# Patient Record
Sex: FEMALE | Race: WHITE | NOT HISPANIC OR LATINO | Employment: FULL TIME | URBAN - METROPOLITAN AREA
[De-identification: names, ages, dates, MRNs, and addresses within clinical notes are randomized per-mention and may not be internally consistent; named-entity substitution may affect disease eponyms.]

---

## 2020-06-29 ENCOUNTER — LAB VISIT (OUTPATIENT)
Dept: PRIMARY CARE CLINIC | Facility: OTHER | Age: 60
End: 2020-06-29
Attending: INTERNAL MEDICINE
Payer: OTHER GOVERNMENT

## 2020-06-29 DIAGNOSIS — Z03.818 ENCOUNTER FOR OBSERVATION FOR SUSPECTED EXPOSURE TO OTHER BIOLOGICAL AGENTS RULED OUT: Primary | ICD-10-CM

## 2020-06-29 PROCEDURE — U0003 INFECTIOUS AGENT DETECTION BY NUCLEIC ACID (DNA OR RNA); SEVERE ACUTE RESPIRATORY SYNDROME CORONAVIRUS 2 (SARS-COV-2) (CORONAVIRUS DISEASE [COVID-19]), AMPLIFIED PROBE TECHNIQUE, MAKING USE OF HIGH THROUGHPUT TECHNOLOGIES AS DESCRIBED BY CMS-2020-01-R: HCPCS

## 2020-07-02 LAB — SARS-COV-2 RNA RESP QL NAA+PROBE: NEGATIVE

## 2020-07-23 ENCOUNTER — LAB VISIT (OUTPATIENT)
Dept: PRIMARY CARE CLINIC | Facility: CLINIC | Age: 60
End: 2020-07-23
Payer: OTHER GOVERNMENT

## 2020-07-23 DIAGNOSIS — Z00.6 RESEARCH STUDY PATIENT: ICD-10-CM

## 2020-07-23 LAB — SARS-COV-2 IGG SERPLBLD QL IA.RAPID: NEGATIVE

## 2020-07-23 PROCEDURE — 86769 SARS-COV-2 COVID-19 ANTIBODY: CPT

## 2020-07-23 PROCEDURE — U0003 INFECTIOUS AGENT DETECTION BY NUCLEIC ACID (DNA OR RNA); SEVERE ACUTE RESPIRATORY SYNDROME CORONAVIRUS 2 (SARS-COV-2) (CORONAVIRUS DISEASE [COVID-19]), AMPLIFIED PROBE TECHNIQUE, MAKING USE OF HIGH THROUGHPUT TECHNOLOGIES AS DESCRIBED BY CMS-2020-01-R: HCPCS

## 2020-07-23 NOTE — RESEARCH
Date of Consent: 7/23/2020    Sponsor: [facility]    Study Title/IRB Number: Observational study of Sars-CoV2 Immunoglobulin G (IgG) seroprevalence among the [facility] population over time 2020.163  Principle Investigator: Ashanti Cortez, PhD    Did the patient need translation services? No   name: N/A    Prior to the Informed Consent (IC) being signed, or any study protocol required data collection, testing, procedure, or intervention being performed, the following was done and/or discussed:   Patient was given a paper copy of the IC for review    Patient was given study FAQ   Purpose of the study and qualifications to participate    Study design and tests or procedures done at this visit   Confidentiality and HIPAA Authorization for Release of Medical Records for the research trial/ subject's rights/research related injury   Risk, Benefits, Alternative Treatments, Compensation and Costs   Participation in the research trial is voluntary and patient may withdraw at anytime   Contact information for study related questions    Patient verbalizes understanding of the above: Yes  Contact information for PI and IRB given to patient: Yes  Patient able to adequately summarize: the purpose of the study, the risks associated with the study, and all procedures, testing, and follow-ups associated with the study: Yes    The consent was discussed verbally with the patient and all questions were answered satisfactorily. Patient gave verbal consent for the Seroprevalence research study with an IRB approval date of 06/23/2020.      The Consent, Consent Witness and name of Clinical Research Coordinator consenting was captured and documented in REDCap.    All Inclusion and Exclusion Criteria reviewed, subject meets all Inclusion criteria and does not meet any Exclusion Criteria at this time.     Patient Eligibility was confirmed.    Patient responded to survey questions.    The following biospecimen  collection procedures were collected:    -Nasopharyngeal Swab Collection  -Blood collection

## 2020-07-24 LAB — SARS-COV-2 RNA RESP QL NAA+PROBE: NOT DETECTED

## 2021-07-01 ENCOUNTER — PATIENT MESSAGE (OUTPATIENT)
Dept: ADMINISTRATIVE | Facility: OTHER | Age: 61
End: 2021-07-01

## 2023-11-02 ENCOUNTER — OFFICE VISIT (OUTPATIENT)
Dept: GASTROENTEROLOGY | Facility: CLINIC | Age: 63
End: 2023-11-02
Payer: COMMERCIAL

## 2023-11-02 VITALS
HEIGHT: 68 IN | DIASTOLIC BLOOD PRESSURE: 82 MMHG | BODY MASS INDEX: 20.98 KG/M2 | SYSTOLIC BLOOD PRESSURE: 112 MMHG | HEART RATE: 77 BPM | WEIGHT: 138.44 LBS

## 2023-11-02 DIAGNOSIS — K86.89 PANCREATIC INSUFFICIENCY: Primary | ICD-10-CM

## 2023-11-02 DIAGNOSIS — R19.5 LOW FECAL ELASTASE LEVEL: ICD-10-CM

## 2023-11-02 PROCEDURE — 1160F PR REVIEW ALL MEDS BY PRESCRIBER/CLIN PHARMACIST DOCUMENTED: ICD-10-PCS | Mod: CPTII,S$GLB,, | Performed by: INTERNAL MEDICINE

## 2023-11-02 PROCEDURE — 3079F PR MOST RECENT DIASTOLIC BLOOD PRESSURE 80-89 MM HG: ICD-10-PCS | Mod: CPTII,S$GLB,, | Performed by: INTERNAL MEDICINE

## 2023-11-02 PROCEDURE — 1159F PR MEDICATION LIST DOCUMENTED IN MEDICAL RECORD: ICD-10-PCS | Mod: CPTII,S$GLB,, | Performed by: INTERNAL MEDICINE

## 2023-11-02 PROCEDURE — 99204 OFFICE O/P NEW MOD 45 MIN: CPT | Mod: S$GLB,,, | Performed by: INTERNAL MEDICINE

## 2023-11-02 PROCEDURE — 3008F BODY MASS INDEX DOCD: CPT | Mod: CPTII,S$GLB,, | Performed by: INTERNAL MEDICINE

## 2023-11-02 PROCEDURE — 3074F PR MOST RECENT SYSTOLIC BLOOD PRESSURE < 130 MM HG: ICD-10-PCS | Mod: CPTII,S$GLB,, | Performed by: INTERNAL MEDICINE

## 2023-11-02 PROCEDURE — 99999 PR PBB SHADOW E&M-EST. PATIENT-LVL III: CPT | Mod: PBBFAC,,, | Performed by: INTERNAL MEDICINE

## 2023-11-02 PROCEDURE — 3079F DIAST BP 80-89 MM HG: CPT | Mod: CPTII,S$GLB,, | Performed by: INTERNAL MEDICINE

## 2023-11-02 PROCEDURE — 99999 PR PBB SHADOW E&M-EST. PATIENT-LVL III: ICD-10-PCS | Mod: PBBFAC,,, | Performed by: INTERNAL MEDICINE

## 2023-11-02 PROCEDURE — 1160F RVW MEDS BY RX/DR IN RCRD: CPT | Mod: CPTII,S$GLB,, | Performed by: INTERNAL MEDICINE

## 2023-11-02 PROCEDURE — 3074F SYST BP LT 130 MM HG: CPT | Mod: CPTII,S$GLB,, | Performed by: INTERNAL MEDICINE

## 2023-11-02 PROCEDURE — 99204 PR OFFICE/OUTPT VISIT, NEW, LEVL IV, 45-59 MIN: ICD-10-PCS | Mod: S$GLB,,, | Performed by: INTERNAL MEDICINE

## 2023-11-02 PROCEDURE — 3008F PR BODY MASS INDEX (BMI) DOCUMENTED: ICD-10-PCS | Mod: CPTII,S$GLB,, | Performed by: INTERNAL MEDICINE

## 2023-11-02 PROCEDURE — 1159F MED LIST DOCD IN RCRD: CPT | Mod: CPTII,S$GLB,, | Performed by: INTERNAL MEDICINE

## 2023-11-02 RX ORDER — PANCRELIPASE LIPASE, PANCRELIPASE PROTEASE, PANCRELIPASE AMYLASE 40000; 126000; 168000 [USP'U]/1; [USP'U]/1; [USP'U]/1
CAPSULE, DELAYED RELEASE ORAL
COMMUNITY
End: 2024-03-06

## 2023-11-02 RX ORDER — ATOMOXETINE 100 MG/1
100 CAPSULE ORAL
COMMUNITY
Start: 2020-05-23 | End: 2024-03-06

## 2023-11-02 RX ORDER — MOXIFLOXACIN 5 MG/ML
SOLUTION/ DROPS OPHTHALMIC
COMMUNITY
Start: 2023-10-23

## 2023-11-02 NOTE — PROGRESS NOTES
Advanced Endoscopy / Pancreaticobiliary Service    Reason for visit (Chief Complaint):  Pancreatic insufficiency    Referring provider/PCP: No address on file    History of Present Illness: Patient presents for further evaluation of pancreatic insufficiency.  She self scheduled this appointment.  Her cousin is a cardiologist in [facility], and suggested that she should see a pancreatic specialist, which is what prompted this appointment.  She is had a fecal elastase checked twice in the last 1 year, and on both occasions this was low.  She does not recall, whether the sample that she is submitted was watery in nature.  She does have history of microscopic colitis and has had exacerbations in the past that were treated with budesonide.  She otherwise reports more commonly suffering from constipation, and she does use MiraLax regularly for this.     She denies history of pancreatitis.  She is not a smoker.  She drinks maybe 1 or 2 glasses of ETOH per day.  She was started on pancreatic enzyme supplementation following the stool sample showing low elastase.      Physical Exam:  General: Well-developed, well-appearing, no acute distress      Laboratory:   Reviewed recent labs that are available in epic.  Her fecal elastase in August was less than 50.    Imaging:  Reviewed CT imaging report.  She had a CT scan performed in May with and without IV contrast.  The pancreas was reported to be normal on that scan.    Assessment/Plan:  Pancreatic insufficiency- I would like to repeat a fecal elastase.  We discussed the importance of submitting a stool sample that is solid appearing without any watery element.  If this again proved to be low, I think next step would include performing an endoscopic ultrasound to obtained a more detailed view of the pancreas.        Eduardo Mondragon MD, MARINA   - Department of Gastroenterology  [facility] [place]

## 2023-11-03 ENCOUNTER — LAB VISIT (OUTPATIENT)
Dept: LAB | Facility: HOSPITAL | Age: 63
End: 2023-11-03
Attending: INTERNAL MEDICINE
Payer: COMMERCIAL

## 2023-11-03 DIAGNOSIS — K86.89 PANCREATIC INSUFFICIENCY: ICD-10-CM

## 2023-11-03 PROCEDURE — 82653 EL-1 FECAL QUANTITATIVE: CPT | Performed by: INTERNAL MEDICINE

## 2023-11-06 ENCOUNTER — PATIENT MESSAGE (OUTPATIENT)
Dept: GASTROENTEROLOGY | Facility: CLINIC | Age: 63
End: 2023-11-06
Payer: COMMERCIAL

## 2023-11-07 LAB — ELASTASE 1, FECAL: 112 MCG/G

## 2023-11-08 ENCOUNTER — TELEPHONE (OUTPATIENT)
Dept: ENDOSCOPY | Facility: HOSPITAL | Age: 63
End: 2023-11-08
Payer: COMMERCIAL

## 2023-11-08 ENCOUNTER — PATIENT MESSAGE (OUTPATIENT)
Dept: ENDOSCOPY | Facility: HOSPITAL | Age: 63
End: 2023-11-08
Payer: COMMERCIAL

## 2023-11-08 DIAGNOSIS — K86.89 PANCREATIC INSUFFICIENCY: Primary | ICD-10-CM

## 2023-11-08 NOTE — TELEPHONE ENCOUNTER
Spoke to patient to schedule procedure(s) Upper Endoscopy Ultrasound (EUS)       Physician to perform procedure(s) Dr. ERIK Mondragon  Date of Procedure (s) 12/8/23  Arrival Time 9:15 AM  Time of Procedure(s) 10:15 AM   Location of Procedure(s) [facility] [address] Floor  Type of Rx Prep sent to patient: Other  Instructions provided to patient via [facility]    Patient was informed on the following information and verbalized understanding. Screening questionnaire reviewed with patient and complete. If procedure requires anesthesia, a responsible adult needs to be present to accompany the patient home, patient cannot drive after receiving anesthesia. Appointment details are tentative, especially check-in time. Patient will receive a prep-op call 4 days prior to confirm check-in time for procedure. If applicable the patient should contact their pharmacy to verify Rx for procedure prep is ready for pick-up. Patient was advised to call the scheduling department at [phone] if pharmacy states no Rx is available. Patient was advised to call the endoscopy scheduling department if any questions or concerns arise.      [facility] Scheduling Department

## 2023-11-09 ENCOUNTER — TELEPHONE (OUTPATIENT)
Dept: GASTROENTEROLOGY | Facility: CLINIC | Age: 63
End: 2023-11-09
Payer: COMMERCIAL

## 2023-11-09 NOTE — TELEPHONE ENCOUNTER
----- Message from Juhi Ag sent at 11/9/2023  8:36 AM CST -----  Regarding: Consult/Advisory  Contact: [phone]  CONSULT/ADVISORY    Name of Caller:  FRANCISCA THURMAN [[id number]]    Contact Preference:  [phone]    Nature of Call:  Pt is requesting a call back.  States she tried to answer Dr Mondragon on the portal but it didn't allow her to reply.  Please call.

## 2023-12-06 ENCOUNTER — TELEPHONE (OUTPATIENT)
Dept: ENDOSCOPY | Facility: HOSPITAL | Age: 63
End: 2023-12-06
Payer: COMMERCIAL

## 2023-12-06 NOTE — TELEPHONE ENCOUNTER
Spoke with patient about arrival time @ 0915.   [facility]    Day Before Procedure 12/07/23      You may have a light evening meal.   No solid food after 7:00 pm.   Continue drinking clear liquids.        Day of the Procedure 12/08/23      You may have water/clear liquids until 4 hours before your procedure or as directed by the scheduling nurse 6:15 AM.   See below for list.     What You CANNOT do:   Do not drink milk or anything colored red.  Do not drink alcohol.  No gum chewing or candy morning of procedure     Liquids That Are OK to Drink:   Water  Sports drinks (Gatorade, Power-Aid)  Coffee or tea (no cream or nondairy creamer)  Clear juices without pulp (apple, white grape)  Gelatin desserts (no fruit or toppings)  Clear soda (sprite, coke, ginger ale)  Chicken broth (until 12 midnight the night before procedure)      Medications: Do not take Insulin or oral diabetic medications the day of the procedure.    Take as prescribed: heart, seizure and blood pressure medication in the morning with a sip of water (less than an ounce).  Take any breathing medications and bring inhalers to hospital with you.     Leave all valuables and jewelry at home. Wear comfortable clothes to procedure to change into hospital gown.   You cannot drive for 24 hours after your procedure because you will receive sedation for your procedure to make you comfortable.    A ride must be provided at discharge.

## 2023-12-08 ENCOUNTER — HOSPITAL ENCOUNTER (OUTPATIENT)
Facility: HOSPITAL | Age: 63
Discharge: HOME | End: 2023-12-08
Attending: INTERNAL MEDICINE | Admitting: INTERNAL MEDICINE
Payer: COMMERCIAL

## 2023-12-08 ENCOUNTER — ANESTHESIA EVENT (OUTPATIENT)
Dept: ENDOSCOPY | Facility: HOSPITAL | Age: 63
End: 2023-12-08
Payer: COMMERCIAL

## 2023-12-08 ENCOUNTER — ANESTHESIA (OUTPATIENT)
Dept: ENDOSCOPY | Facility: HOSPITAL | Age: 63
End: 2023-12-08
Payer: COMMERCIAL

## 2023-12-08 VITALS
WEIGHT: 134 LBS | BODY MASS INDEX: 20.31 KG/M2 | TEMPERATURE: 99 F | OXYGEN SATURATION: 100 % | SYSTOLIC BLOOD PRESSURE: 108 MMHG | DIASTOLIC BLOOD PRESSURE: 68 MMHG | RESPIRATION RATE: 14 BRPM | HEIGHT: 68 IN | HEART RATE: 75 BPM

## 2023-12-08 DIAGNOSIS — K86.89 PANCREATIC INSUFFICIENCY: ICD-10-CM

## 2023-12-08 PROCEDURE — 63600175 PHARM REV CODE 636 W HCPCS: Performed by: NURSE ANESTHETIST, CERTIFIED REGISTERED

## 2023-12-08 PROCEDURE — 25000003 PHARM REV CODE 250: Performed by: NURSE ANESTHETIST, CERTIFIED REGISTERED

## 2023-12-08 PROCEDURE — 25000003 PHARM REV CODE 250: Performed by: INTERNAL MEDICINE

## 2023-12-08 PROCEDURE — 43259 EGD US EXAM DUODENUM/JEJUNUM: CPT | Performed by: INTERNAL MEDICINE

## 2023-12-08 PROCEDURE — 43259 PR ENDOSCOPIC ULTRASOUND EXAM: ICD-10-PCS | Mod: ,,, | Performed by: INTERNAL MEDICINE

## 2023-12-08 PROCEDURE — 43259 EGD US EXAM DUODENUM/JEJUNUM: CPT | Mod: ,,, | Performed by: INTERNAL MEDICINE

## 2023-12-08 PROCEDURE — 37000008 HC ANESTHESIA 1ST 15 MINUTES: Performed by: INTERNAL MEDICINE

## 2023-12-08 PROCEDURE — D9220A PRA ANESTHESIA: ICD-10-PCS | Mod: ANES,,, | Performed by: STUDENT IN AN ORGANIZED HEALTH CARE EDUCATION/TRAINING PROGRAM

## 2023-12-08 PROCEDURE — D9220A PRA ANESTHESIA: Mod: CRNA,,, | Performed by: NURSE ANESTHETIST, CERTIFIED REGISTERED

## 2023-12-08 PROCEDURE — 37000009 HC ANESTHESIA EA ADD 15 MINS: Performed by: INTERNAL MEDICINE

## 2023-12-08 PROCEDURE — D9220A PRA ANESTHESIA: ICD-10-PCS | Mod: CRNA,,, | Performed by: NURSE ANESTHETIST, CERTIFIED REGISTERED

## 2023-12-08 PROCEDURE — D9220A PRA ANESTHESIA: Mod: ANES,,, | Performed by: STUDENT IN AN ORGANIZED HEALTH CARE EDUCATION/TRAINING PROGRAM

## 2023-12-08 RX ORDER — SODIUM CHLORIDE 0.9 % (FLUSH) 0.9 %
10 SYRINGE (ML) INJECTION
Status: DISCONTINUED | OUTPATIENT
Start: 2023-12-08 | End: 2023-12-08 | Stop reason: HOSPADM

## 2023-12-08 RX ORDER — PROPOFOL 10 MG/ML
VIAL (ML) INTRAVENOUS
Status: DISCONTINUED | OUTPATIENT
Start: 2023-12-08 | End: 2023-12-08

## 2023-12-08 RX ORDER — SODIUM CHLORIDE 9 MG/ML
INJECTION, SOLUTION INTRAVENOUS CONTINUOUS
Status: DISCONTINUED | OUTPATIENT
Start: 2023-12-08 | End: 2023-12-08 | Stop reason: HOSPADM

## 2023-12-08 RX ADMIN — SODIUM CHLORIDE: 9 INJECTION, SOLUTION INTRAVENOUS at 10:12

## 2023-12-08 RX ADMIN — SODIUM CHLORIDE: 0.9 INJECTION, SOLUTION INTRAVENOUS at 10:12

## 2023-12-08 RX ADMIN — PROPOFOL 80 MG: 10 INJECTION, EMULSION INTRAVENOUS at 10:12

## 2023-12-08 NOTE — PROVATION PATIENT INSTRUCTIONS
Discharge Summary/Instructions after an Endoscopic Procedure  Patient Name: Sushma Elder  Patient MRN: [id number]  Patient YOB: 1960 Friday, December 8, 2023  Eduardo Mondragon MD  Dear patient,  As a result of recent federal legislation (The Federal Cures Act), you may   receive lab or pathology results from your procedure in your [facility]   account before your physician is able to contact you. Your physician or   their representative will relay the results to you with their   recommendations at their soonest availability.  Thank you,  Your health is very important to us during the Covid Crisis. Following your   procedure today, you will receive a daily text for 2 weeks asking about   signs or symptoms of Covid 19.  Please respond to this text when you   receive it so we can follow up and keep you as safe as possible.   RESTRICTIONS:  During your procedure today, you received medications for sedation.  These   medications may affect your judgment, balance and coordination.  Therefore,   for 24 hours, you have the following restrictions:   - DO NOT drive a car, operate machinery, make legal/financial decisions,   sign important papers or drink alcohol.    ACTIVITY:  Today: no heavy lifting, straining or running due to procedural   sedation/anesthesia.  The following day: return to full activity including work.  DIET:  Eat and drink normally unless instructed otherwise.     TREATMENT FOR COMMON SIDE EFFECTS:  - Mild abdominal pain, nausea, belching, bloating or excessive gas:  rest,   eat lightly and use a heating pad.  - Sore Throat: treat with throat lozenges and/or gargle with warm salt   water.  - Because air was used during the procedure, expelling large amounts of air   from your rectum or belching is normal.  - If a bowel prep was taken, you may not have a bowel movement for 1-3 days.    This is normal.  SYMPTOMS TO WATCH FOR AND REPORT TO YOUR PHYSICIAN:  1. Abdominal pain or bloating, other than  gas cramps.  2. Chest pain.  3. Back pain.  4. Signs of infection such as: chills or fever occurring within 24 hours   after the procedure.  5. Rectal bleeding, which would show as bright red, maroon, or black stools.   (A tablespoon of blood from the rectum is not serious, especially if   hemorrhoids are present.)  6. Vomiting.  7. Weakness or dizziness.  GO DIRECTLY TO THE NEAREST EMERGENCY ROOM IF YOU HAVE ANY OF THE FOLLOWING:      Difficulty breathing              Chills and/or fever over 101 F   Persistent vomiting and/or vomiting blood   Severe abdominal pain   Severe chest pain   Black, tarry stools   Bleeding- more than one tablespoon   Any other symptom or condition that you feel may need urgent attention  Your doctor recommends these additional instructions:  If any biopsies were taken, your doctors clinic will contact you in 1 to 2   weeks with any results.  - Discharge patient to home (ambulatory).   - Patient has a contact number available for emergencies.  The signs and   symptoms of potential delayed complications were discussed with the   patient.  Return to normal activities tomorrow.  Written discharge   instructions were provided to the patient.   - Continue present medications.   - Return to primary care physician as previously scheduled.   - There are no EUS features to support chronic pancreatitis. Although recent   fecal elastase was borderline low, I wonder if this may be falsely low.   Given that patient reports that she is not taking pancreatic enzymes and   feels fine, it may be reasonable to stop this for now and assess clinical   course. Will discuss with patient.  For questions, problems or results please call your physician - Eduardo Mondragon MD.  EMERGENCY PHONE NUMBER: [phone],  LAB RESULTS: [phone]  IF A COMPLICATION OR EMERGENCY SITUATION ARISES AND YOU ARE UNABLE TO REACH   YOUR PHYSICIAN - GO DIRECTLY TO THE EMERGENCY ROOM.  Eduardo Mondragon MD  12/8/2023 11:24:48  AM  This report has been verified and signed electronically.  Dear patient,  As a result of recent federal legislation (The Federal Cures Act), you may   receive lab or pathology results from your procedure in your [facility]   account before your physician is able to contact you. Your physician or   their representative will relay the results to you with their   recommendations at their soonest availability.  Thank you,  PROVATION

## 2023-12-08 NOTE — H&P
Short Stay Endoscopy History and Physical    PCP - Kem Horn MD  Referring Physician - Kem Horn MD  [id number]  [facility]  [place] [id number]    Procedure - EUS  ASA - per anesthesia  Mallampati - per anesthesia  History of Anesthesia problems - no  Family history Anesthesia problems -  no   Plan of anesthesia - General    HPI:  This is a 63 y.o. female here for evaluation of: pancreatic insufficiency    Reflux - no  Dysphagia - no  Abdominal pain - no  Diarrhea - no    ROS:  Constitutional: No fevers, chills, No weight loss  CV: No chest pain  Pulm: No cough, No shortness of breath  GI: see HPI    Medical History:  has a past medical history of Lichen sclerosus, Microscopic colitis, and Osteopenia (08/29/2023).    Surgical History:  has a past surgical history that includes Dilation and curettage of uterus; Diagnostic laparoscopy; Hysteroscopy; myosure; Breast biopsy; Cervical fusion (07/15/2021); and Hand surgery (Right, 03/2023).    Family History: family history includes Hypertension in her father and mother..    Social History:  reports that she has never smoked. She has never used smokeless tobacco.    Review of patient's allergies indicates:  No Known Allergies    Medications:   Medications Prior to Admission   Medication Sig Dispense Refill Last Dose    atomoxetine (STRATTERA) 100 mg capsule Take 100 mg by mouth.   12/8/2023    clobetasoL (TEMOVATE) 0.05 % cream Apply topically 2 (two) times daily. 45 g 0 Past Week    desvenlafaxine succinate (PRISTIQ) 100 MG Tb24    12/8/2023    moxifloxacin (VIGAMOX) 0.5 % ophthalmic solution Place into both eyes.   12/8/2023    traZODone (DESYREL) 100 MG tablet    12/7/2023    ZENPEP 40,000-126,000- 168,000 unit CpDR Take by mouth.   12/7/2023       Physical Exam:    Vital Signs:   Vitals:    12/08/23 1027   BP: 117/71   Pulse: 80   Resp: 18   Temp: 98.9 °F (37.2 °C)       General Appearance: Well appearing in no acute  distress  Lungs: no labored breathing  CVS:  regular rate  Abdomen: non tender    Labs:  Lab Results   Component Value Date     12/01/2021    K 4.2 12/01/2021    CREATININE 0.87 12/01/2021    BUN 18 12/01/2021    TSH 3.12 09/13/2023       I have explained the risks and benefits of this endoscopic procedure to the patient including but not limited to bleeding, inflammation, infection, perforation, and death.      Eduardo Mondragon MD

## 2023-12-08 NOTE — TRANSFER OF CARE
"Anesthesia Transfer of Care Note    Patient: Sushma Elder    Procedure(s) Performed: Procedure(s) (LRB):  ULTRASOUND, UPPER GI TRACT, ENDOSCOPIC (N/A)    Patient location: PACU    Transport from OR: Transported from OR on room air with adequate spontaneous ventilation    Post pain: adequate analgesia    Post assessment: no apparent anesthetic complications    Post vital signs: stable    Level of consciousness: sedated    Nausea/Vomiting: no nausea/vomiting    Complications: none    Transfer of care protocol was followed    Last vitals: Visit Vitals  /71   Pulse 80   Temp 37.2 °C (98.9 °F)   Resp 18   Ht 5' 8" (1.727 m)   Wt 60.8 kg (134 lb)   SpO2 100%   Breastfeeding No   BMI 20.37 kg/m²     "

## 2023-12-08 NOTE — ANESTHESIA PREPROCEDURE EVALUATION
[facility]  Anesthesia Pre-Operative Evaluation         Patient Name: Sushma Elder  YOB: 1960  MRN: [id number]    SUBJECTIVE:     12/08/2023    Procedure(s) (LRB):  ULTRASOUND, UPPER GI TRACT, ENDOSCOPIC (N/A)    Sushma Elder is a 63 y.o. female here for Procedure(s) (LRB):  ULTRASOUND, UPPER GI TRACT, ENDOSCOPIC (N/A)    Drips:     There is no problem list on file for this patient.      Review of patient's allergies indicates:  No Known Allergies    No current facility-administered medications on file prior to encounter.     Current Outpatient Medications on File Prior to Encounter   Medication Sig Dispense Refill    atomoxetine (STRATTERA) 100 mg capsule Take 100 mg by mouth.      clobetasoL (TEMOVATE) 0.05 % cream Apply topically 2 (two) times daily. 45 g 0    desvenlafaxine succinate (PRISTIQ) 100 MG Tb24       moxifloxacin (VIGAMOX) 0.5 % ophthalmic solution Place into both eyes.      traZODone (DESYREL) 100 MG tablet       ZENPEP 40,000-126,000- 168,000 unit CpDR Take by mouth.         Past Surgical History:   Procedure Laterality Date    BREAST BIOPSY      CERVICAL FUSION  07/15/2021    DIAGNOSTIC LAPAROSCOPY      DILATION AND CURETTAGE OF UTERUS      HAND SURGERY Right 03/2023    HYSTEROSCOPY      myosure         Social History     Socioeconomic History    Marital status: Single   Tobacco Use    Smoking status: Never    Smokeless tobacco: Never         OBJECTIVE:     Vital Signs Range (Last 24H):  Temp:  [37.2 °C (98.9 °F)] 37.2 °C (98.9 °F)  Pulse:  [80] 80  Resp:  [18] 18  SpO2:  [100 %] 100 %  BP: (117)/(71) 117/71    Significant Labs:  Lab Results   Component Value Date     12/01/2021    K 4.2 12/01/2021    CREATININE 0.87 12/01/2021    BUN 18 12/01/2021    TSH 3.12 09/13/2023             Pre-op Assessment    I have reviewed the Patient Summary Reports.     I have reviewed the Nursing Notes. I have reviewed the NPO Status.   I have reviewed the Medications.      Review of Systems  Anesthesia Hx:  No problems with previous Anesthesia   History of prior surgery of interest to airway management or planning:            Denies Personal Hx of Anesthesia complications.                    Social:  Non-Smoker, No Alcohol Use       Cardiovascular:      Denies Hypertension.   Denies MI.                                         Pulmonary:  Pulmonary Normal   Denies COPD.  Denies Asthma.     Denies Sleep Apnea.                Hepatic/GI:      Denies GERD. Denies Liver Disease.  Gi issues   Exocrine pancreatitic insufficiency           Neurological:  Neurology Normal Denies TIA.  Denies CVA.    Denies Seizures.                                Endocrine:  Endocrine Normal Denies Diabetes. Denies Hypothyroidism.  Denies Hyperthyroidism.             Physical Exam  General: Well nourished, Cooperative, Alert and Oriented    Airway:  Mallampati: II / I  Mouth Opening: Normal  TM Distance: Normal  Tongue: Normal    Dental:  Intact        Anesthesia Plan  Type of Anesthesia, risks & benefits discussed:    Anesthesia Type: Gen Natural Airway  Intra-op Monitoring Plan: Standard ASA Monitors  Post Op Pain Control Plan: multimodal analgesia  Induction:  IV  Informed Consent: Informed consent signed with the Patient and all parties understand the risks and agree with anesthesia plan.  All questions answered.   ASA Score: 2  Day of Surgery Review of History & Physical: H&P Update referred to the surgeon/provider.    Ready For Surgery From Anesthesia Perspective.     .

## 2023-12-08 NOTE — ANESTHESIA POSTPROCEDURE EVALUATION
Anesthesia Post Evaluation    Patient: Sushma Elder    Procedure(s) Performed: Procedure(s) (LRB):  ULTRASOUND, UPPER GI TRACT, ENDOSCOPIC (N/A)    Final Anesthesia Type: general      Patient location during evaluation: PACU  Patient participation: Yes- Able to Participate  Level of consciousness: awake  Post-procedure vital signs: reviewed and stable  Pain management: adequate  Airway patency: patent    PONV status at discharge: No PONV  Anesthetic complications: no      Cardiovascular status: blood pressure returned to baseline  Respiratory status: unassisted  Hydration status: euvolemic  Follow-up not needed.              Vitals Value Taken Time   /68 12/08/23 1135   Pulse 75 12/08/23 1135   Resp 14 12/08/23 1135   SpO2 100 % 12/08/23 1135         Event Time   Out of Recovery 11:36:06         Pain/Sarita Score: Sarita Score: 10 (12/8/2023 11:20 AM)

## 2023-12-08 NOTE — PLAN OF CARE
Pre-procedure completed with pt, friend at the bedside. Bed in lowest position with call light within reach.

## 2023-12-20 ENCOUNTER — TELEPHONE (OUTPATIENT)
Dept: ENDOSCOPY | Facility: HOSPITAL | Age: 63
End: 2023-12-20
Payer: COMMERCIAL

## 2023-12-20 DIAGNOSIS — R93.89 ABNORMAL FINDING ON IMAGING: Primary | ICD-10-CM

## 2023-12-22 ENCOUNTER — TELEPHONE (OUTPATIENT)
Dept: ENDOSCOPY | Facility: HOSPITAL | Age: 63
End: 2023-12-22
Payer: COMMERCIAL

## 2023-12-22 NOTE — TELEPHONE ENCOUNTER
Spoke to pt. [facility] w/ Dr. Mondragon scheduled. Pt stated will have PCP placed order for CMP. No other concerns at this time.

## 2025-03-04 ENCOUNTER — TELEPHONE (OUTPATIENT)
Dept: PAIN MEDICINE | Facility: CLINIC | Age: 65
End: 2025-03-04
Payer: MEDICARE

## 2025-03-04 NOTE — TELEPHONE ENCOUNTER
Reached out to patient to schedule appointment from messages. Apt has been made.   Pt understand. All questions answered.     Luis Mathias Medical Assistant

## 2025-03-05 NOTE — PROGRESS NOTES
"New Patient Interventional Pain Note (Initial Visit)    Referring Physician: Loida Vang    PCP: Kem Horn MD    Chief Complaint:   Chief Complaint   Patient presents with    Back Pain        SUBJECTIVE:    Sushma Elder is a 64 y.o. female with past medical history significant for ADD, osteopenia who presents to the clinic for the evaluation of lower back and groin pain as well as shoulder pain.  Patient reports her most severe pain is in the lower back and groin territory.  Pain has been present for several decades without inciting accident injury or trauma.  Pain has been particularly severe since February 2, 2025 ("out of the blue").  Pain is intermittent and today is rated a 6/10.  Pain at its best is a 3/10 at its worse is a 9/10.  Pain is described as aching, burning and a tightness sensation in the lower back.  Patient reports she is very active and participates in spin, yoga, [employer] personal training and stretching as well as barre classes.  She believes maybe the barre classes incited her current symptoms.  She denies more distal radiculopathy into the lower extremities or feet, weakness in the lower extremities or bowel or bladder incontinence.  In the lower back and groin can be exacerbated when getting out of bed, bending, walking and lifting.  Pain is improved with stretching and yoga classes as well as combination of Mobic 7.5 mg and Robaxin 750 mg.  Patient has trialed gabapentin in the past which made her feel crazy" and has also taken Cymbalta which may have been for antidepressant purposes.  She has received prior lumbar epidural steroid injection over 10 years prior at an outside institution.  She has continued physician directed physical therapy exercises for lower back and groin pain over the last 8 weeks from January 6, 2025 through March 6, 2025 with noticeable improvement in pain range of motion and functionality.    She also reports bilateral shoulder pain in the " "area of her traps."She has a history of prior cervical fusion ("C2-4")with Dr. Milton Connor ([facility]) in 2022.  She reports having elbow and wrist pain and neck pain for over 30 years prior to her surgery which after 1 year of surgery her pain in her elbow and wrist has completely resolved.  Today her primary concern is tightness along bilateral trapezius and rhomboid distributions.  Patient has undergone TENS stimulation with physical therapy.    Of note patient has been enrolled in conventional land-based physical therapy at [facility] from 05/06/2024 through 03/06/2025 and is continuing twice weekly sessions.  She has received prior cervical epidural steroid injection at an outside institution more than 10 years prior.    Patient does mention that she has been diagnosed with early Alzheimer's and receives Leqembi IV anti-amyloid infusions for this from her neurologist (Dr. Jorge Denney).    Patient denies night fever/night sweats, urinary incontinence, bowel incontinence, significant weight loss, significant motor weakness, and loss of sensations.      Pain Disability Index Review:         3/6/2025     1:05 PM   Last 3 PDI Scores   Pain Disability Index (PDI) 26       Non-Pharmacologic Treatments:  Physical Therapy/Home Exercise: yes  Ice/Heat:yes  TENS: yes  Acupuncture: yes  Massage: yes  Chiropractic: no    Other: yes; ACDF; Dr. Milton Connor      Pain Medications:  - Adjuvant Medications: Trazodone (Desyrel)    Pain Procedures:   -outside institution: [facility]    Past Medical History:   Diagnosis Date    ADD (attention deficit disorder)     Colitis     Lichen sclerosus     Microscopic colitis     Osteopenia 08/29/2023    Osteopenia of spine Normal Hip     Past Surgical History:   Procedure Laterality Date    BREAST BIOPSY      CERVICAL FUSION  07/15/2021    COLONOSCOPY  09/2021    DIAGNOSTIC LAPAROSCOPY      DILATION AND CURETTAGE OF UTERUS      ENDOSCOPIC ULTRASOUND OF UPPER GASTROINTESTINAL TRACT N/A " 2023    Procedure: ULTRASOUND, UPPER GI TRACT, ENDOSCOPIC;  Surgeon: Eduardo Mondragon MD;  Location: [facility];  Service: Endoscopy;  Laterality: N/A;   portal instr    HAND SURGERY Right 2023    HYSTEROSCOPY      myosure      REPAIR OF MENISCUS OF KNEE Right 2024     Review of patient's allergies indicates:  No Known Allergies    Current Outpatient Medications   Medication Sig    clobetasoL (TEMOVATE) 0.05 % cream Apply topically to affected area twice daily    desvenlafaxine succinate (PRISTIQ) 100 MG Tb24     desvenlafaxine succinate (PRISTIQ) 100 MG Tb24 Take 1 tablet by mouth once daily.    MAGNESIUM CITRATE ORAL Take by mouth.    memantine (NAMENDA) 10 MG Tab SMARTSI Tablet(s) By Mouth Morning-Night    moxifloxacin (VIGAMOX) 0.5 % ophthalmic solution Place into both eyes.    perfluorohexyloctane, PF, 100 % Drop Apply 1 drop to eye.    traZODone (DESYREL) 100 MG tablet     traZODone (DESYREL) 100 MG tablet Take 1 tablet by mouth every evening.    meloxicam (MOBIC) 7.5 MG tablet Take 1 tablet (7.5 mg total) by mouth once daily.    methocarbamoL (ROBAXIN) 750 MG Tab Take 1 tablet (750 mg total) by mouth 3 (three) times daily as needed.     No current facility-administered medications for this visit.         ROS:  GENERAL:  No weight loss, malaise or fevers.  HEENT:   No recent changes in vision or hearing  NECK:  Negative for lumps, no difficulty with swallowing.  RESPIRATORY:  Negative for cough, wheezing or shortness of breath, patient denies any recent URI.  CARDIOVASCULAR:  Negative for chest pain or palpitations.  GI:  Negative for abdominal discomfort, blood in stools or black stools or change in bowel habits.  MUSCULOSKELETAL:  See HPI.  SKIN:  Negative for lesions, rash, and itching.  PSYCH:  No mood disorder or recent psychosocial stressors.   HEMATOLOGY/LYMPHOLOGY:  Negative for prolonged bleeding, bruising easily or swollen nodes.    NEURO:   No history of syncope, paralysis, seizures  "or tremors.  All other reviewed and negative other than HPI.    OBJECTIVE:    BP 99/63   Pulse 83   Resp 17   Ht 5' 8" (1.727 m)   Wt 62.8 kg (138 lb 7.2 oz)   BMI 21.05 kg/m²       Physical Exam:    GENERAL: Well appearing, in no acute distress, alert and oriented x3.  PSYCH:  Mood and affect appropriate.  SKIN: Skin color, texture, turgor normal, no rashes or lesions.  HEAD/FACE:  Normocephalic, atraumatic. Cranial nerves grossly intact.    NECK:  pain to palpation over the bilateral trapezius muscles. Spurling Negative. No pain with neck flexion, extension, or lateral flexion.   Normaltesting biceps, triceps and brachioradialis bilaterally.    NegativeHoffmann's bilaterally.    5/5 strength testing deltoid, biceps, triceps, wrist extensor, wrist flexor and ulnar intrinsics bilaterally.    Normal  strength bilaterally    CV: RRR with palpation of the radial artery.  PULM: No evidence of respiratory difficulty, symmetric chest rise.  GI:  Soft and non-tender.    BACK: Straight leg raising in the sitting and supine positions is negative to radicular pain. No pain to palpation over the facet joints of the lumbar spine or spinous processes. Normal range of motion without pain reproduction.  EXTREMITIES: Peripheral joint ROM is full and pain free without obvious instability or laxity in all four extremities. No deformities, edema, or skin discoloration. Good capillary refill.  MUSCULOSKELETAL: Able to stand on heels & toes.   Shoulder, hip, and knee provocative maneuvers are negative.    SIJ testing:  - TTP over SI joint: Present  - Alejandra's/ Rizwan's: Positive    - Sacroiliac Distraction Test (anterior pressure): Positive  - Sacroiliac Compression Test (lateral pressure): Positive   - SacralThrust Test (posterior pressure): Positive     Facet loading test is negative bilaterally.   Bilateral upper and lower extremity strength is normal and symmetric.  No atrophy or tone abnormalities are noted.    RIGHT Lower " extremity: Hip flexion 5/5, Hip Abduction 5/5, Hip Adduction 5/5, Knee extension 5/5, Knee flexion 5/5, Ankle dorsiflexion5/5, Extensor hallucis longus 5/5, Ankle plantarflexion 5/5  LEFT Lower extremity:  Hip flexion 5/5, Hip Abduction 5/5,Hip Adduction 5/5, Knee extension 5/5, Knee flexion 5/5, Ankle dorsiflexion 5/5, Extensor hallucis longus 5/5, Ankle plantarflexion 5/5  -Normal testing knee (patellar) jerk and ankle (achilles) jerk    NEURO: Bilateral upper and lower extremity coordination and muscle stretch reflexes are physiologic and symmetric. No loss of sensation is noted.  GAIT: normal.    Imaging:   X-ray cervical spine 09/29/2014  FINDINGS:   AP and lateral views of the spine.     There is a mild reversal of lordosis, typically from positioning and/or muscle   spasm.  This study does not exclude ligamentous injury.     Multilevel moderate disc space narrowing at C3-C4, C4-C5, C5-C6, C6-C7 with   multiple tiny osteophytes.  Prevertebral soft tissues normal.       ASSESSMENT: 64 y.o. year old female with     1. Lumbar radiculopathy, chronic  MRI Lumbar Spine Without Contrast    X-Ray Lumbar Complete Including Flex And Ext      2. Sacroiliitis  X-Ray Lumbar Complete Including Flex And Ext    Case Request-RAD/Other Procedure Area: Bilateral Sacroiliac Joint Injection      3. Cervical myofascial pain syndrome  X-Ray Cervical Spine 5 View W Flex Extxt    HME - OTHER      4. S/P cervical spinal fusion      Dr. Milton Connor; 2022            PLAN:   - Interventions:  Schedule for bilateral SI joint injection to see if this helps with pain secondary to sacroiliitis. Explained the risks and benefits of the procedure in detail with the patient today in clinic along with alternative treatment options, and the patient elected to pursue the intervention at this time.      - Anticoagulation use: No no anticoagulation     report:  Reviewed and consistent with medication use as prescribed.    - Medications:  -Continue  Mobic 7.5 mg QD. We have discussed potential deleterious side effects of NSAIDs on the cardiovascular, gastrointestinal and renal systems. We have discussed judicious use of this medication. Pt expresses understanding.     -Continue an anti spasmodic, Robaxin  up to 750 mg thrice daily PRN  for myofascial pain.  We have discussed potential deleterious side effects associated with this medication including  dizziness, drowsiness, stomach upset, nausea vomiting or blurred vision.  Patient expresses understanding.    -A TENS unit was provided to the patient and they were instructed on its use by the [employer] ([employer]) representatives. The patient was counseled that this may help treat their myofascial pain through transcutaneous electrical nerve stimulation and excitation via an electric current.  We have discussed that the unit is usually connected to the skin using two or more electrodes, which are typically conductive gel pads.  A typical battery-operated TENS unit is able to modulate pulse width, frequency and intensity to help reduce pain.    -We have discussed considering future membrane stabilizing agent for neuropathic properties.    - Therapy:   We discussed continuing conventional land-based physical therapy to help manage the patient/s painful condition. The patient was counseled that muscle strengthening will improve the long term prognosis in regards to pain and may also help increase range of motion and mobility.  Of note patient has been enrolled in conventional land-based physical therapy at [facility] from 05/06/2024 through 03/06/2025 and is continuing twice weekly sessions.       - Imaging: Reviewed available imaging(x-ray cervical spine) with patient and answered any questions they had regarding study.  X-ray cervical spine, x-ray lumbar spine, MRI lumbar spine to better evaluate pain    - Consults/Referrals:(PRN)  -Dr. Jorge Denney: Neurology; early onset Alzheimer's    - Records:  Obtain old records from outside physicians and imaging        - Follow up visit: return to clinic in 4-6 weeks status post injection and to review MRI.  Extended visit with me      The above plan and management options were discussed at length with patient. Patient is in agreement with the above and verbalized understanding.    - I discussed the goals of interventional chronic pain management with the patient on today's visit. We discussed a multimodal and systematic approach to pain.  This includes diagnostic and therapeutic injections, adjuvant pharmacologic treatment, physical therapy, and at times psychiatry.  I emphasized the importance of regular exercise, core strengthening and stretching, diet and weight loss as a cornerstone of long-term pain management.    - This condition does not require this patient to take time off of work, and the primary goal of our Pain Management services is to improve the patient's functional capacity.  - Patient Questions: Answered all of the patient's questions regarding diagnoses, therapy, treatment and next steps    - Direct patient care provided: 20 minutes+. Over 50% of the time was spent counseling/educating the patient. Total time spent on patient care including prep time reviewing the chart before the visit, time spent with patient during the visit, and the time spent after the visit reviewing studies, documenting note, obtaining information from collaborative providers, etc.: >40 minutes.     Visit today included increased complexity associated with the care of the episodic problem of chronic pain which was addressed and continue to manage the longitudinal care of the patient due to the serious and/or complex managed problem(s) listed above.      Juan Clarke MD  Interventional Pain Management  [facility]    Disclaimer:  This note was prepared using voice recognition system and is likely to have sound alike errors that may have been overlooked even after proof  reading.  Please call me with any questions

## 2025-03-05 NOTE — H&P (VIEW-ONLY)
"New Patient Interventional Pain Note (Initial Visit)    Referring Physician: Loida Vang    PCP: Kem Horn MD    Chief Complaint:   Chief Complaint   Patient presents with    Back Pain        SUBJECTIVE:    Sushma Elder is a 64 y.o. female with past medical history significant for ADD, osteopenia who presents to the clinic for the evaluation of lower back and groin pain as well as shoulder pain.  Patient reports her most severe pain is in the lower back and groin territory.  Pain has been present for several decades without inciting accident injury or trauma.  Pain has been particularly severe since February 2, 2025 ("out of the blue").  Pain is intermittent and today is rated a 6/10.  Pain at its best is a 3/10 at its worse is a 9/10.  Pain is described as aching, burning and a tightness sensation in the lower back.  Patient reports she is very active and participates in spin, yoga, [employer] personal training and stretching as well as barre classes.  She believes maybe the barre classes incited her current symptoms.  She denies more distal radiculopathy into the lower extremities or feet, weakness in the lower extremities or bowel or bladder incontinence.  In the lower back and groin can be exacerbated when getting out of bed, bending, walking and lifting.  Pain is improved with stretching and yoga classes as well as combination of Mobic 7.5 mg and Robaxin 750 mg.  Patient has trialed gabapentin in the past which made her feel crazy" and has also taken Cymbalta which may have been for antidepressant purposes.  She has received prior lumbar epidural steroid injection over 10 years prior at an outside institution.  She has continued physician directed physical therapy exercises for lower back and groin pain over the last 8 weeks from January 6, 2025 through March 6, 2025 with noticeable improvement in pain range of motion and functionality.    She also reports bilateral shoulder pain in the " "area of her traps."She has a history of prior cervical fusion ("C2-4")with Dr. Milton Connor ([facility]) in 2022.  She reports having elbow and wrist pain and neck pain for over 30 years prior to her surgery which after 1 year of surgery her pain in her elbow and wrist has completely resolved.  Today her primary concern is tightness along bilateral trapezius and rhomboid distributions.  Patient has undergone TENS stimulation with physical therapy.    Of note patient has been enrolled in conventional land-based physical therapy at [facility] from 05/06/2024 through 03/06/2025 and is continuing twice weekly sessions.  She has received prior cervical epidural steroid injection at an outside institution more than 10 years prior.    Patient does mention that she has been diagnosed with early Alzheimer's and receives Leqembi IV anti-amyloid infusions for this from her neurologist (Dr. Jorge Denney).    Patient denies night fever/night sweats, urinary incontinence, bowel incontinence, significant weight loss, significant motor weakness, and loss of sensations.      Pain Disability Index Review:         3/6/2025     1:05 PM   Last 3 PDI Scores   Pain Disability Index (PDI) 26       Non-Pharmacologic Treatments:  Physical Therapy/Home Exercise: yes  Ice/Heat:yes  TENS: yes  Acupuncture: yes  Massage: yes  Chiropractic: no    Other: yes; ACDF; Dr. Milton Connor      Pain Medications:  - Adjuvant Medications: Trazodone (Desyrel)    Pain Procedures:   -outside institution: [facility]    Past Medical History:   Diagnosis Date    ADD (attention deficit disorder)     Colitis     Lichen sclerosus     Microscopic colitis     Osteopenia 08/29/2023    Osteopenia of spine Normal Hip     Past Surgical History:   Procedure Laterality Date    BREAST BIOPSY      CERVICAL FUSION  07/15/2021    COLONOSCOPY  09/2021    DIAGNOSTIC LAPAROSCOPY      DILATION AND CURETTAGE OF UTERUS      ENDOSCOPIC ULTRASOUND OF UPPER GASTROINTESTINAL TRACT N/A " 2023    Procedure: ULTRASOUND, UPPER GI TRACT, ENDOSCOPIC;  Surgeon: Eduardo Mondragon MD;  Location: [facility];  Service: Endoscopy;  Laterality: N/A;   portal instr    HAND SURGERY Right 2023    HYSTEROSCOPY      myosure      REPAIR OF MENISCUS OF KNEE Right 2024     Review of patient's allergies indicates:  No Known Allergies    Current Outpatient Medications   Medication Sig    clobetasoL (TEMOVATE) 0.05 % cream Apply topically to affected area twice daily    desvenlafaxine succinate (PRISTIQ) 100 MG Tb24     desvenlafaxine succinate (PRISTIQ) 100 MG Tb24 Take 1 tablet by mouth once daily.    MAGNESIUM CITRATE ORAL Take by mouth.    memantine (NAMENDA) 10 MG Tab SMARTSI Tablet(s) By Mouth Morning-Night    moxifloxacin (VIGAMOX) 0.5 % ophthalmic solution Place into both eyes.    perfluorohexyloctane, PF, 100 % Drop Apply 1 drop to eye.    traZODone (DESYREL) 100 MG tablet     traZODone (DESYREL) 100 MG tablet Take 1 tablet by mouth every evening.    meloxicam (MOBIC) 7.5 MG tablet Take 1 tablet (7.5 mg total) by mouth once daily.    methocarbamoL (ROBAXIN) 750 MG Tab Take 1 tablet (750 mg total) by mouth 3 (three) times daily as needed.     No current facility-administered medications for this visit.         ROS:  GENERAL:  No weight loss, malaise or fevers.  HEENT:   No recent changes in vision or hearing  NECK:  Negative for lumps, no difficulty with swallowing.  RESPIRATORY:  Negative for cough, wheezing or shortness of breath, patient denies any recent URI.  CARDIOVASCULAR:  Negative for chest pain or palpitations.  GI:  Negative for abdominal discomfort, blood in stools or black stools or change in bowel habits.  MUSCULOSKELETAL:  See HPI.  SKIN:  Negative for lesions, rash, and itching.  PSYCH:  No mood disorder or recent psychosocial stressors.   HEMATOLOGY/LYMPHOLOGY:  Negative for prolonged bleeding, bruising easily or swollen nodes.    NEURO:   No history of syncope, paralysis, seizures  "or tremors.  All other reviewed and negative other than HPI.    OBJECTIVE:    BP 99/63   Pulse 83   Resp 17   Ht 5' 8" (1.727 m)   Wt 62.8 kg (138 lb 7.2 oz)   BMI 21.05 kg/m²       Physical Exam:    GENERAL: Well appearing, in no acute distress, alert and oriented x3.  PSYCH:  Mood and affect appropriate.  SKIN: Skin color, texture, turgor normal, no rashes or lesions.  HEAD/FACE:  Normocephalic, atraumatic. Cranial nerves grossly intact.    NECK:  pain to palpation over the bilateral trapezius muscles. Spurling Negative. No pain with neck flexion, extension, or lateral flexion.   Normaltesting biceps, triceps and brachioradialis bilaterally.    NegativeHoffmann's bilaterally.    5/5 strength testing deltoid, biceps, triceps, wrist extensor, wrist flexor and ulnar intrinsics bilaterally.    Normal  strength bilaterally    CV: RRR with palpation of the radial artery.  PULM: No evidence of respiratory difficulty, symmetric chest rise.  GI:  Soft and non-tender.    BACK: Straight leg raising in the sitting and supine positions is negative to radicular pain. No pain to palpation over the facet joints of the lumbar spine or spinous processes. Normal range of motion without pain reproduction.  EXTREMITIES: Peripheral joint ROM is full and pain free without obvious instability or laxity in all four extremities. No deformities, edema, or skin discoloration. Good capillary refill.  MUSCULOSKELETAL: Able to stand on heels & toes.   Shoulder, hip, and knee provocative maneuvers are negative.    SIJ testing:  - TTP over SI joint: Present  - Alejandra's/ Rizwan's: Positive    - Sacroiliac Distraction Test (anterior pressure): Positive  - Sacroiliac Compression Test (lateral pressure): Positive   - SacralThrust Test (posterior pressure): Positive     Facet loading test is negative bilaterally.   Bilateral upper and lower extremity strength is normal and symmetric.  No atrophy or tone abnormalities are noted.    RIGHT Lower " extremity: Hip flexion 5/5, Hip Abduction 5/5, Hip Adduction 5/5, Knee extension 5/5, Knee flexion 5/5, Ankle dorsiflexion5/5, Extensor hallucis longus 5/5, Ankle plantarflexion 5/5  LEFT Lower extremity:  Hip flexion 5/5, Hip Abduction 5/5,Hip Adduction 5/5, Knee extension 5/5, Knee flexion 5/5, Ankle dorsiflexion 5/5, Extensor hallucis longus 5/5, Ankle plantarflexion 5/5  -Normal testing knee (patellar) jerk and ankle (achilles) jerk    NEURO: Bilateral upper and lower extremity coordination and muscle stretch reflexes are physiologic and symmetric. No loss of sensation is noted.  GAIT: normal.    Imaging:   X-ray cervical spine 09/29/2014  FINDINGS:   AP and lateral views of the spine.     There is a mild reversal of lordosis, typically from positioning and/or muscle   spasm.  This study does not exclude ligamentous injury.     Multilevel moderate disc space narrowing at C3-C4, C4-C5, C5-C6, C6-C7 with   multiple tiny osteophytes.  Prevertebral soft tissues normal.       ASSESSMENT: 64 y.o. year old female with     1. Lumbar radiculopathy, chronic  MRI Lumbar Spine Without Contrast    X-Ray Lumbar Complete Including Flex And Ext      2. Sacroiliitis  X-Ray Lumbar Complete Including Flex And Ext    Case Request-RAD/Other Procedure Area: Bilateral Sacroiliac Joint Injection      3. Cervical myofascial pain syndrome  X-Ray Cervical Spine 5 View W Flex Extxt    HME - OTHER      4. S/P cervical spinal fusion      Dr. Milton Connor; 2022            PLAN:   - Interventions:  Schedule for bilateral SI joint injection to see if this helps with pain secondary to sacroiliitis. Explained the risks and benefits of the procedure in detail with the patient today in clinic along with alternative treatment options, and the patient elected to pursue the intervention at this time.      - Anticoagulation use: No no anticoagulation     report:  Reviewed and consistent with medication use as prescribed.    - Medications:  -Continue  Mobic 7.5 mg QD. We have discussed potential deleterious side effects of NSAIDs on the cardiovascular, gastrointestinal and renal systems. We have discussed judicious use of this medication. Pt expresses understanding.     -Continue an anti spasmodic, Robaxin  up to 750 mg thrice daily PRN  for myofascial pain.  We have discussed potential deleterious side effects associated with this medication including  dizziness, drowsiness, stomach upset, nausea vomiting or blurred vision.  Patient expresses understanding.    -A TENS unit was provided to the patient and they were instructed on its use by the [employer] ([employer]) representatives. The patient was counseled that this may help treat their myofascial pain through transcutaneous electrical nerve stimulation and excitation via an electric current.  We have discussed that the unit is usually connected to the skin using two or more electrodes, which are typically conductive gel pads.  A typical battery-operated TENS unit is able to modulate pulse width, frequency and intensity to help reduce pain.    -We have discussed considering future membrane stabilizing agent for neuropathic properties.    - Therapy:   We discussed continuing conventional land-based physical therapy to help manage the patient/s painful condition. The patient was counseled that muscle strengthening will improve the long term prognosis in regards to pain and may also help increase range of motion and mobility.  Of note patient has been enrolled in conventional land-based physical therapy at [facility] from 05/06/2024 through 03/06/2025 and is continuing twice weekly sessions.       - Imaging: Reviewed available imaging(x-ray cervical spine) with patient and answered any questions they had regarding study.  X-ray cervical spine, x-ray lumbar spine, MRI lumbar spine to better evaluate pain    - Consults/Referrals:(PRN)  -Dr. Jorge Denney: Neurology; early onset Alzheimer's    - Records:  Obtain old records from outside physicians and imaging        - Follow up visit: return to clinic in 4-6 weeks status post injection and to review MRI.  Extended visit with me      The above plan and management options were discussed at length with patient. Patient is in agreement with the above and verbalized understanding.    - I discussed the goals of interventional chronic pain management with the patient on today's visit. We discussed a multimodal and systematic approach to pain.  This includes diagnostic and therapeutic injections, adjuvant pharmacologic treatment, physical therapy, and at times psychiatry.  I emphasized the importance of regular exercise, core strengthening and stretching, diet and weight loss as a cornerstone of long-term pain management.    - This condition does not require this patient to take time off of work, and the primary goal of our Pain Management services is to improve the patient's functional capacity.  - Patient Questions: Answered all of the patient's questions regarding diagnoses, therapy, treatment and next steps    - Direct patient care provided: 20 minutes+. Over 50% of the time was spent counseling/educating the patient. Total time spent on patient care including prep time reviewing the chart before the visit, time spent with patient during the visit, and the time spent after the visit reviewing studies, documenting note, obtaining information from collaborative providers, etc.: >40 minutes.     Visit today included increased complexity associated with the care of the episodic problem of chronic pain which was addressed and continue to manage the longitudinal care of the patient due to the serious and/or complex managed problem(s) listed above.      Juan Clarke MD  Interventional Pain Management  [facility]    Disclaimer:  This note was prepared using voice recognition system and is likely to have sound alike errors that may have been overlooked even after proof  reading.  Please call me with any questions

## 2025-03-06 ENCOUNTER — HOSPITAL ENCOUNTER (OUTPATIENT)
Dept: RADIOLOGY | Facility: HOSPITAL | Age: 65
Discharge: HOME | End: 2025-03-06
Attending: ANESTHESIOLOGY
Payer: MEDICARE

## 2025-03-06 ENCOUNTER — OFFICE VISIT (OUTPATIENT)
Dept: PAIN MEDICINE | Facility: CLINIC | Age: 65
End: 2025-03-06
Payer: MEDICARE

## 2025-03-06 VITALS
HEART RATE: 83 BPM | HEIGHT: 68 IN | BODY MASS INDEX: 20.98 KG/M2 | RESPIRATION RATE: 17 BRPM | DIASTOLIC BLOOD PRESSURE: 63 MMHG | SYSTOLIC BLOOD PRESSURE: 99 MMHG | WEIGHT: 138.44 LBS

## 2025-03-06 DIAGNOSIS — M79.18 CERVICAL MYOFASCIAL PAIN SYNDROME: ICD-10-CM

## 2025-03-06 DIAGNOSIS — M46.1 SACROILIITIS: ICD-10-CM

## 2025-03-06 DIAGNOSIS — Z98.1 S/P CERVICAL SPINAL FUSION: ICD-10-CM

## 2025-03-06 DIAGNOSIS — M54.16 LUMBAR RADICULOPATHY, CHRONIC: Primary | ICD-10-CM

## 2025-03-06 DIAGNOSIS — M54.16 LUMBAR RADICULOPATHY, CHRONIC: ICD-10-CM

## 2025-03-06 PROCEDURE — 72050 X-RAY EXAM NECK SPINE 4/5VWS: CPT | Mod: 26,,, | Performed by: RADIOLOGY

## 2025-03-06 PROCEDURE — G2211 COMPLEX E/M VISIT ADD ON: HCPCS | Mod: S$PBB,,, | Performed by: ANESTHESIOLOGY

## 2025-03-06 PROCEDURE — 99999 PR PBB SHADOW E&M-EST. PATIENT-LVL V: CPT | Mod: PBBFAC,,, | Performed by: ANESTHESIOLOGY

## 2025-03-06 PROCEDURE — 99215 OFFICE O/P EST HI 40 MIN: CPT | Mod: PBBFAC,PO | Performed by: ANESTHESIOLOGY

## 2025-03-06 PROCEDURE — 99205 OFFICE O/P NEW HI 60 MIN: CPT | Mod: S$PBB,,, | Performed by: ANESTHESIOLOGY

## 2025-03-06 PROCEDURE — 72110 X-RAY EXAM L-2 SPINE 4/>VWS: CPT | Mod: 26,,, | Performed by: RADIOLOGY

## 2025-03-06 PROCEDURE — 72110 X-RAY EXAM L-2 SPINE 4/>VWS: CPT | Mod: TC,FY,PO

## 2025-03-06 PROCEDURE — 72050 X-RAY EXAM NECK SPINE 4/5VWS: CPT | Mod: TC,FY,PO

## 2025-03-06 RX ORDER — TRAZODONE HYDROCHLORIDE 100 MG/1
1 TABLET ORAL NIGHTLY
COMMUNITY
Start: 2025-03-05

## 2025-03-06 RX ORDER — MELOXICAM 7.5 MG/1
7.5 TABLET ORAL
COMMUNITY
Start: 2025-02-17 | End: 2025-03-06 | Stop reason: SDUPTHER

## 2025-03-06 RX ORDER — MEMANTINE HYDROCHLORIDE 10 MG/1
TABLET ORAL
COMMUNITY

## 2025-03-06 RX ORDER — METHOCARBAMOL 750 MG/1
TABLET, FILM COATED ORAL
COMMUNITY
End: 2025-03-06 | Stop reason: SDUPTHER

## 2025-03-06 RX ORDER — MELOXICAM 7.5 MG/1
7.5 TABLET ORAL DAILY
Qty: 90 TABLET | Refills: 0 | Status: SHIPPED | OUTPATIENT
Start: 2025-03-06 | End: 2025-06-04

## 2025-03-06 RX ORDER — METHOCARBAMOL 750 MG/1
750 TABLET, FILM COATED ORAL 3 TIMES DAILY PRN
Qty: 270 TABLET | Refills: 0 | Status: SHIPPED | OUTPATIENT
Start: 2025-03-06 | End: 2025-06-04

## 2025-03-06 RX ORDER — DESVENLAFAXINE 100 MG/1
1 TABLET, EXTENDED RELEASE ORAL DAILY
COMMUNITY
Start: 2024-11-06

## 2025-03-06 NOTE — PATIENT INSTRUCTIONS
General Neck and Back Pain    Both neck and back pain are usually caused by injury to the muscles or ligaments of the spine. Sometimes the disks that separate each bone of the spine may cause pain by pressing on a nearby nerve. Back and neck pain may appear after a sudden twisting or bending force (such as in a car accident), or sometimes after a simple awkward movement. In either case, muscle spasm is often present and adds to the pain.  Acute neck and back pain usually gets better in 1 to 2 weeks. Pain related to disk disease, arthritis in the spinal joints or spinal stenosis (narrowing of the spinal canal) can become chronic and last for months or years.  Back and neck pain are common problems. Most people feel better in 1 or 2 weeks, and most of the rest in 1 to 2 months. Most people can remain active.  People experience and describe pain differently.  Pain can be sharp, stabbing, shooting, aching, cramping, or burning  Movement, standing, bending, lifting, sitting, or walking may worsen the pain  Pain can be localized to one spot or area, or it can be more generalized  Pain can spread or radiate upwards, downwards, to the front, or go down your arms  Muscle spasm may occur.  Most of the time mechanical problems with the muscles or spine cause the pain. it is usually caused by an injury, whether known or not, to the muscles or ligaments. While illnesses can cause back pain, it is usually not caused by a serious illness. Pain is usually related to physical activity, whether sports, exercise, work, or normal activity. Sometimes it can occur without an identifiable cause. This can happen simply by stretching or moving wrong, without noting pain at the time. Other causes include:  Overexertion, lifting, pushing, pulling incorrectly or too aggressively.  Sudden twisting, bending or stretching from an accident (car or fall), or accidental movement.  Poor posture  Poor conditioning, lack of regular exercise  Spinal  disc disease or arthritis  Stress  Pregnancy, or illness like appendicitis, bladder or kidney infection, pelvic infections   Home care  For neck pain: Use a comfortable pillow that supports the head and keeps the spine in a neutral position. The position of the head should not be tilted forward or backward.  When in bed, try to find a position of comfort. A firm mattress is best. Try lying flat on your back with pillows under your knees. You can also try lying on your side with your knees bent up towards your chest and a pillow between your knees.  At first, do not try to stretch out the sore spots. If there is a strain, it is not like the good soreness you get after exercising without an injury. In this case, stretching may make it worse.  Avoid prolonged sitting, long car rides or travel. This puts more stress on the lower back than standing or walking.  During the first 24 to 72 hours after an injury, apply an ice pack to the painful area for 20 minutes and then remove it for 20 minutes over a period of 60 to 90 minutes or several times a day.   You can alternate ice and heat therapies. Talk with your healthcare provider about the best treatment for your back or neck pain. As a safety precaution, do not use a heating pad at bedtime. Sleeping with a heating pad can lead to skin burns or tissue damage.  Therapeutic massage can help relax the back and neck muscles without stretching them.  Be aware of safe lifting methods and do not lift anything over 15 pounds until all the pain is gone.  Medications  Talk to your healthcare provider before using medicine, especially if you have other medical problems or are taking other medicines.  You may use over-the-counter medicine to control pain, unless another pain medicine was prescribed. If you have chronic conditions like diabetes, liver or kidney disease, stomach ulcers,  gastrointestinal bleeding, or are taking blood thinner medicines.  Be careful if you are given pain  medicines, narcotics, or medicine for muscle spasm. They can cause drowsiness, and can affect your coordination, reflexes, and judgment. Do not drive or operate heavy machinery.  Follow-up care  Follow up with your healthcare provider, or as advised. Physical therapy or further tests may be needed.  If X-rays were taken, you will be notified of any new findings that may affect your care.  Call 911  Seek emergency medical care if any of the following occur:  Trouble breathing  Confusion  Very drowsy or trouble awakening  Fainting or loss of consciousness  Rapid or very slow heart rate  Loss of bowel or bladder control  When to seek medical advice  Call your healthcare provider right away if any of these occur:  Pain becomes worse or spreads into your arms or legs  Weakness, numbness or pain in one or both arms or legs  Numbness in the groin area  Difficulty walking  Fever of 100.4ºF (38ºC) or higher, or as directed by your healthcare provider  Date Last Reviewed: 7/1/2016  © 7879-0257 [employer]. [address] [place] [address]. All rights reserved. This information is not intended as a substitute for professional medical care. Always follow your healthcare professional's instructions.       Exercises to Strengthen Your Lower Back  Strong lower back and abdominal muscles work together to support your spine. The exercises below will help strengthen the lower back. It is important that you begin exercising slowly and increase levels gradually.  Always begin any exercise program with stretching. If you feel pain while doing any of these exercises, stop and talk to your doctor about a more specific exercise program that better suits your condition.   Low back stretch  The point of stretching is to make you more flexible and increase your range of motion. Stretch only as much as you are able. Stretch slowly. Do not push your stretch to the limit. If at any point you feel pain while stretching,  this is your (temporary) limit.  Lie on your back with your knees bent and both feet on the ground.  Slowly raise your left knee to your chest as you flatten your lower back against the floor. Hold for 5 seconds.  Relax and repeat the exercise with your right knee.  Do 10 of these exercises for each leg.  Repeat hugging both knees to your chest at the same time.  Building lower back strength  Start your exercise routine with 10 to 30 minutes a day, 1 to 3 times a day.  Initial exercises  Lying on your back:  Ankle pumps: Move your foot up and down, towards your head, and then away. Repeat 10 times with each foot.  Heel slides: Slowly bend your knee, drawing the heel of your foot towards you. Then slide your heel/foot from you, straightening your knee. Do not lift your foot off the floor (this is not a leg lift).  Abdominal contraction: Bend your knees and put your hands on your stomach. Tighten your stomach muscles. Hold for 5 seconds, then relax. Repeat 10 times.  Straight leg raise: Bend one leg at the knee and keep the other leg straight. Tighten your stomach muscles. Slowly lift your straight leg 6 to 12 inches off the floor and hold for up to 5 seconds. Repeat 10 times on each side.  Standing:  Wall squats: Stand with your back against the wall. Move your feet about 12 inches away from the wall. Tighten your stomach muscles, and slowly bend your knees until they are at about a 45 degree angle. Do not go down too far. Hold about 5 seconds. Then slowly return to your starting position. Repeat 10 times.  Heel raises: Stand facing the wall. Slowly raise the heels of your feet up and down, while keeping your toes on the floor. If you have trouble balancing, you can touch the wall with your hands. Repeat 10 times.  More advanced exercises  When you feel comfortable enough, try these exercises.  Kneeling lumbar extension: Begin on your hands and knees. At the same time, raise and straighten your right arm and left leg  until they are parallel to the ground. Hold for 2 seconds and come back slowly to a starting position. Repeat with left arm and right leg, alternating 10 times.  Prone lumbar extension: Lie face down, arms extended overhead, palms on the floor. At the same time, raise your right arm and left leg as high as comfortably possible. Hold for 10 seconds and slowly return to start. Repeat with left arm and right leg, alternating 10 times. Gradually build up to 20 times. (Advanced: Repeat this exercise raising both arms and both legs a few inches off the floor at the same time. Hold for 5 seconds and release.)  Pelvic tilt: Lie on the floor on your back with your knees bent at 90 degrees. Your feet should be flat on the floor. Inhale, exhale, then slowly contract your abdominal muscles bringing your navel toward your spine. Let your pelvis rock back until your lower back is flat on the floor. Hold for 10 seconds while breathing smoothly.  Abdominal crunch: Perform a pelvic tilt (above) flattening your lower back against the floor. Holding the tension in your abdominal muscles, take another breath and raise your shoulder blades off the ground (this is not a full sit-up). Keep your head in line with your body (dont bend your neck forward). Hold for 2 seconds, then slowly lower.  Date Last Reviewed: 6/1/2016  © 2460-2169 [employer]. [address] [place] [address], [place] [address]. All rights reserved. This information is not intended as a substitute for professional medical care. Always follow your healthcare professional's instructions.

## 2025-03-07 ENCOUNTER — PATIENT MESSAGE (OUTPATIENT)
Dept: PAIN MEDICINE | Facility: CLINIC | Age: 65
End: 2025-03-07
Payer: MEDICARE

## 2025-03-07 ENCOUNTER — TELEPHONE (OUTPATIENT)
Dept: PAIN MEDICINE | Facility: CLINIC | Age: 65
End: 2025-03-07
Payer: MEDICARE

## 2025-03-17 ENCOUNTER — TELEPHONE (OUTPATIENT)
Dept: PAIN MEDICINE | Facility: CLINIC | Age: 65
End: 2025-03-17
Payer: MEDICARE

## 2025-03-17 NOTE — TELEPHONE ENCOUNTER
----- Message from Emely sent at 3/17/2025  1:58 PM CDT -----  Contact: Sushma  Type:  Patient Requesting a call back Who Called:Sushma What is the call back request regarding?:pt would like additional information about appt on 3/28/25Would the patient rather a call back or a response via [facility]?call Best Call Back Number:[phone] Information:

## 2025-03-17 NOTE — TELEPHONE ENCOUNTER
Inform pt that I do not have the time for the procedure, Ms.Julirosa from the procedure department will call them 2-5 day from your procedure date with the arrival time. Pt understood.

## 2025-03-18 NOTE — PRE-PROCEDURE INSTRUCTIONS
Spoke with patient regarding procedure scheduled on 3.28     Arrival time 0730     Has patient been sick with fever or on antibiotics within the last 7 days? No     Does the patient have any open wounds, sores or rashes? No     Does the patient have any recent fractures? no     Has patient received a vaccination within the last 7 days? No     Received the COVID vaccination? yes     Has the patient stopped all medications as directed? na     Does patient have a pacemaker, defibrillator, or implantable stimulator? No     Does the patient have a ride to and from procedure and someone reliable to remain with patient?  viktor     Is the patient diabetic? no      Does the patient have sleep apnea? Or use O2 at home? no     Is the patient receiving sedation? Yes      Is the patient instructed to remain NPO beginning at midnight the night before their procedure? yes     Procedure location confirmed with patient? Yes     Covid- Denies signs/symptoms. Instructed to notify PAT/MD if any changes.

## 2025-03-20 ENCOUNTER — HOSPITAL ENCOUNTER (OUTPATIENT)
Dept: RADIOLOGY | Facility: HOSPITAL | Age: 65
Discharge: HOME | End: 2025-03-20
Attending: ANESTHESIOLOGY
Payer: MEDICARE

## 2025-03-20 DIAGNOSIS — M54.16 LUMBAR RADICULOPATHY, CHRONIC: ICD-10-CM

## 2025-03-20 PROCEDURE — 72148 MRI LUMBAR SPINE W/O DYE: CPT | Mod: TC

## 2025-03-20 PROCEDURE — 72148 MRI LUMBAR SPINE W/O DYE: CPT | Mod: 26,,, | Performed by: STUDENT IN AN ORGANIZED HEALTH CARE EDUCATION/TRAINING PROGRAM

## 2025-03-21 ENCOUNTER — PATIENT MESSAGE (OUTPATIENT)
Dept: PAIN MEDICINE | Facility: CLINIC | Age: 65
End: 2025-03-21
Payer: MEDICARE

## 2025-03-28 ENCOUNTER — PATIENT MESSAGE (OUTPATIENT)
Dept: PAIN MEDICINE | Facility: HOSPITAL | Age: 65
End: 2025-03-28

## 2025-03-28 ENCOUNTER — HOSPITAL ENCOUNTER (OUTPATIENT)
Facility: HOSPITAL | Age: 65
Discharge: HOME | End: 2025-03-28
Attending: ANESTHESIOLOGY | Admitting: ANESTHESIOLOGY
Payer: MEDICARE

## 2025-03-28 VITALS
DIASTOLIC BLOOD PRESSURE: 60 MMHG | BODY MASS INDEX: 20.34 KG/M2 | HEART RATE: 86 BPM | RESPIRATION RATE: 17 BRPM | TEMPERATURE: 97 F | WEIGHT: 134.25 LBS | HEIGHT: 68 IN | SYSTOLIC BLOOD PRESSURE: 117 MMHG | OXYGEN SATURATION: 98 %

## 2025-03-28 DIAGNOSIS — M46.1 SACROILIITIS: ICD-10-CM

## 2025-03-28 PROCEDURE — 25500020 PHARM REV CODE 255: Performed by: ANESTHESIOLOGY

## 2025-03-28 PROCEDURE — 27096 INJECT SACROILIAC JOINT: CPT | Mod: 50,,, | Performed by: ANESTHESIOLOGY

## 2025-03-28 PROCEDURE — 27096 INJECT SACROILIAC JOINT: CPT | Mod: 50 | Performed by: ANESTHESIOLOGY

## 2025-03-28 PROCEDURE — 25000003 PHARM REV CODE 250: Performed by: ANESTHESIOLOGY

## 2025-03-28 PROCEDURE — 63600175 PHARM REV CODE 636 W HCPCS: Performed by: ANESTHESIOLOGY

## 2025-03-28 RX ORDER — INDOMETHACIN 25 MG/1
CAPSULE ORAL
Status: DISCONTINUED | OUTPATIENT
Start: 2025-03-28 | End: 2025-03-28 | Stop reason: HOSPADM

## 2025-03-28 RX ORDER — BUPIVACAINE HYDROCHLORIDE 2.5 MG/ML
INJECTION, SOLUTION EPIDURAL; INFILTRATION; INTRACAUDAL; PERINEURAL
Status: DISCONTINUED | OUTPATIENT
Start: 2025-03-28 | End: 2025-03-28 | Stop reason: HOSPADM

## 2025-03-28 RX ORDER — TRIAMCINOLONE ACETONIDE 40 MG/ML
INJECTION, SUSPENSION INTRA-ARTICULAR; INTRAMUSCULAR
Status: DISCONTINUED | OUTPATIENT
Start: 2025-03-28 | End: 2025-03-28 | Stop reason: HOSPADM

## 2025-03-28 RX ORDER — MIDAZOLAM HYDROCHLORIDE 1 MG/ML
INJECTION, SOLUTION INTRAMUSCULAR; INTRAVENOUS
Status: DISCONTINUED | OUTPATIENT
Start: 2025-03-28 | End: 2025-03-28 | Stop reason: HOSPADM

## 2025-03-28 RX ORDER — FENTANYL CITRATE 50 UG/ML
INJECTION, SOLUTION INTRAMUSCULAR; INTRAVENOUS
Status: DISCONTINUED | OUTPATIENT
Start: 2025-03-28 | End: 2025-03-28 | Stop reason: HOSPADM

## 2025-03-28 NOTE — OP NOTE
Sushma Elder  64 y.o. female      Vitals:    03/28/25 0828   BP: 120/72   Pulse: 79   Resp: 16   Temp:        Procedure Date 03/28/2025        INFORMED CONSENT: The procedure, risks, benefits and options were discussed with patient. There are no contraindications to the procedure. The patient expressed understanding and agreed to proceed. The personnel performing the procedure was discussed. I verify that I personally obtained consent prior to the start of the procedure and the signed consent can be found on the patient's chart.       Anesthesia:   Conscious sedation provided by M.D    The patient was monitored with continuous pulse oximetry, EKG, and intermittent blood pressure monitors.  The patient was hemodynamically stable throughout the entire process was responsive to voice, and breathing spontaneously.  Supplemental O2 was provided at 2L/min via nasal cannula.  Patient was comfortable for the duration of the procedure. (See nurse documentation and case log for sedation time)    There was a total of 2mg IV Midazolam and 100mcg Fentanyl titrated for the procedure    Pre Procedure diagnosis: Sacroiliitis [M46.1]  Post-Procedure diagnosis: SAME      PROCEDURE:   Bilateral sacroiliac joint injection                            REASON FOR PROCEDURE:   Sacroiliitis [M46.1]        MEDICATIONS INJECTED: 1mL 40mg/ml Kenalog and 4mL Bupivacaine 0.25% into each site    LOCAL ANESTHETIC USED: Xylocaine 1% 6ml     ESTIMATED BLOOD LOSS: None.   COMPLICATIONS: None.     TECHNIQUE:     Sacroiliac joint injection:   Laying in the prone position, the patient was prepped and draped in the usual sterile fashion using ChloraPrep and fenestrated drape.  The area was determined under fluoroscopy.  Local Xylocaine was injected by raising a wheel and going down to the periosteum using a 27-gauge hypodermic needle.  The 3.5 inch 22-gauge spinal needle was introduce into the Bilateral sacroiliac joint.  Negative pressure applied to  confirm no intravascular placement.  Omnipaque was injected to confirm placement and to confirm that there was no vascular runoff.  The medication was then injected slowly.  The patient tolerated the procedure well.                       The patient was monitored for approximately 30 minutes after the procedure. Patient was given post procedure and discharge instructions to follow at home. We will see the patient back in two weeks or the patient may call to inform of status. The patient was discharged in a stable condition

## 2025-03-28 NOTE — DISCHARGE SUMMARY
Discharge Note  Short Stay      SUMMARY     Admit Date: 3/28/2025    Attending Physician: Juan Clarke MD        Discharge Physician: Juan Clarke MD        Discharge Date: 3/28/2025 8:34 AM    Procedure(s) (LRB):  Bilateral Sacroiliac Joint Injection (Bilateral)    Final Diagnosis: Sacroiliitis [M46.1]    Disposition: Home or self care    Patient Instructions:   Current Discharge Medication List        CONTINUE these medications which have NOT CHANGED    Details   !! desvenlafaxine succinate (PRISTIQ) 100 MG Tb24 Take 1 tablet by mouth once daily.      memantine (NAMENDA) 10 MG Tab SMARTSI Tablet(s) By Mouth Morning-Night      clobetasoL (TEMOVATE) 0.05 % cream Apply topically to affected area twice daily  Qty: 45 g, Refills: 0    Associated Diagnoses: Itching      !! desvenlafaxine succinate (PRISTIQ) 100 MG Tb24       MAGNESIUM CITRATE ORAL Take by mouth.      meloxicam (MOBIC) 7.5 MG tablet Take 1 tablet (7.5 mg total) by mouth once daily.  Qty: 90 tablet, Refills: 0      methocarbamoL (ROBAXIN) 750 MG Tab Take 1 tablet (750 mg total) by mouth 3 (three) times daily as needed.  Qty: 270 tablet, Refills: 0      moxifloxacin (VIGAMOX) 0.5 % ophthalmic solution Place into both eyes.      perfluorohexyloctane, PF, 100 % Drop Apply 1 drop to eye.      !! traZODone (DESYREL) 100 MG tablet       !! traZODone (DESYREL) 100 MG tablet Take 1 tablet by mouth every evening.       !! - Potential duplicate medications found. Please discuss with provider.              Discharge Diagnosis: Sacroiliitis [M46.1]  Condition on Discharge: Stable with no complications to procedure   Diet on Discharge: Same as before.  Activity: as per instruction sheet.  Discharge to: Home with a responsible adult.  Follow up: 2-4 weeks       Please call the office at [phone] if you experience any weakness or loss of sensation, fever > 101.5, pain uncontrolled with oral medications, persistent nausea/vomiting/or diarrhea, redness or  drainage from the incisions, or any other worrisome concerns. If physician on call was not reached or could not communicate with our office for any reason please go to the nearest emergency department

## 2025-03-28 NOTE — DISCHARGE INSTRUCTIONS

## 2025-04-14 ENCOUNTER — HOSPITAL ENCOUNTER (OUTPATIENT)
Dept: RADIOLOGY | Facility: HOSPITAL | Age: 65
Discharge: HOME | End: 2025-04-14
Attending: NURSE PRACTITIONER
Payer: MEDICARE

## 2025-04-14 ENCOUNTER — PATIENT MESSAGE (OUTPATIENT)
Dept: PAIN MEDICINE | Facility: CLINIC | Age: 65
End: 2025-04-14

## 2025-04-14 ENCOUNTER — OFFICE VISIT (OUTPATIENT)
Dept: PAIN MEDICINE | Facility: CLINIC | Age: 65
End: 2025-04-14
Payer: MEDICARE

## 2025-04-14 VITALS
HEART RATE: 85 BPM | BODY MASS INDEX: 20.2 KG/M2 | SYSTOLIC BLOOD PRESSURE: 108 MMHG | WEIGHT: 136.38 LBS | DIASTOLIC BLOOD PRESSURE: 75 MMHG | HEIGHT: 69 IN

## 2025-04-14 DIAGNOSIS — Z98.1 S/P CERVICAL SPINAL FUSION: ICD-10-CM

## 2025-04-14 DIAGNOSIS — M25.551 BILATERAL HIP PAIN: Primary | ICD-10-CM

## 2025-04-14 DIAGNOSIS — M25.552 BILATERAL HIP PAIN: Primary | ICD-10-CM

## 2025-04-14 DIAGNOSIS — M79.18 CERVICAL MYOFASCIAL PAIN SYNDROME: ICD-10-CM

## 2025-04-14 DIAGNOSIS — M54.16 LUMBAR RADICULOPATHY, CHRONIC: ICD-10-CM

## 2025-04-14 DIAGNOSIS — M46.1 SACROILIITIS: ICD-10-CM

## 2025-04-14 DIAGNOSIS — M25.551 BILATERAL HIP PAIN: ICD-10-CM

## 2025-04-14 DIAGNOSIS — M25.552 BILATERAL HIP PAIN: ICD-10-CM

## 2025-04-14 PROCEDURE — 99214 OFFICE O/P EST MOD 30 MIN: CPT | Mod: S$PBB,,, | Performed by: NURSE PRACTITIONER

## 2025-04-14 PROCEDURE — 99213 OFFICE O/P EST LOW 20 MIN: CPT | Mod: PBBFAC,25 | Performed by: NURSE PRACTITIONER

## 2025-04-14 PROCEDURE — 99999 PR PBB SHADOW E&M-EST. PATIENT-LVL III: CPT | Mod: PBBFAC,,, | Performed by: NURSE PRACTITIONER

## 2025-04-14 PROCEDURE — 73521 X-RAY EXAM HIPS BI 2 VIEWS: CPT | Mod: TC

## 2025-04-14 PROCEDURE — 73521 X-RAY EXAM HIPS BI 2 VIEWS: CPT | Mod: 26,,, | Performed by: RADIOLOGY

## 2025-04-14 RX ORDER — TOPIRAMATE 25 MG/1
TABLET ORAL
Qty: 55 TABLET | Refills: 0 | Status: SHIPPED | OUTPATIENT
Start: 2025-04-14 | End: 2025-05-14

## 2025-04-14 NOTE — PROGRESS NOTES
"Interventional Pain Note    Referring Physician: No ref. provider found    PCP: Kem Horn MD    Chief Complaint:   Chief Complaint   Patient presents with    Low-back Pain    hamstring pain    Hip Pain    Neck Pain    Shoulder Pain        SUBJECTIVE:  Interval History (4/14/2025):  Patient Sushma Elder presents today for follow-up visit.  Patient was last seen on 3/28/2025 bilateral SIJ injection with 100% of all of her pain in the body x 9 days then pain returned. Chief complaint is pain in the bilateral hips that radiate to the lul groin. She denies back pain today or radiculopathy into the legs although states her hamstrings will feel tight and bother her. Pain is worse when she wakes up in the morning as well as with prolonged walking and standing. She rates her pain 7/10.  She has recently attended therapy for bilateral shoulder pain and knee pain which helped. Has not attended for hip/low back pain.  Continue to have neck pain that radiates to the shoulders. PT has helped.  Patient denies night fever/night sweats, urinary incontinence, bowel incontinence, significant weight loss and significant motor weakness.   Patient denies any other complaints or concerns at this time.      3/6/2025  Sushma Elder is a 64 y.o. female with past medical history significant for ADD, osteopenia who presents to the clinic for the evaluation of lower back and groin pain as well as shoulder pain.  Patient reports her most severe pain is in the lower back and groin territory.  Pain has been present for several decades without inciting accident injury or trauma.  Pain has been particularly severe since February 2, 2025 ("out of the blue").  Pain is intermittent and today is rated a 6/10.  Pain at its best is a 3/10 at its worse is a 9/10.  Pain is described as aching, burning and a tightness sensation in the lower back.  Patient reports she is very active and participates in spin, yoga, [employer] personal " "training and stretching as well as barre classes.  She believes maybe the barre classes incited her current symptoms.  She denies more distal radiculopathy into the lower extremities or feet, weakness in the lower extremities or bowel or bladder incontinence.  In the lower back and groin can be exacerbated when getting out of bed, bending, walking and lifting.  Pain is improved with stretching and yoga classes as well as combination of Mobic 7.5 mg and Robaxin 750 mg.  Patient has trialed gabapentin in the past which made her feel crazy" and has also taken Cymbalta which may have been for antidepressant purposes.  She has received prior lumbar epidural steroid injection over 10 years prior at an outside institution.  She has continued physician directed physical therapy exercises for lower back and groin pain over the last 8 weeks from January 6, 2025 through March 6, 2025 with noticeable improvement in pain range of motion and functionality.    She also reports bilateral shoulder pain in the area of her traps."She has a history of prior cervical fusion ("C2-4")with Dr. Milton Connor ([facility]) in 2022.  She reports having elbow and wrist pain and neck pain for over 30 years prior to her surgery which after 1 year of surgery her pain in her elbow and wrist has completely resolved.  Today her primary concern is tightness along bilateral trapezius and rhomboid distributions.  Patient has undergone TENS stimulation with physical therapy.    Of note patient has been enrolled in conventional land-based physical therapy at [facility] from 05/06/2024 through 03/06/2025 and is continuing twice weekly sessions.  She has received prior cervical epidural steroid injection at an outside institution more than 10 years prior.    Patient does mention that she has been diagnosed with early Alzheimer's and receives Leqembi IV anti-amyloid infusions for this from her neurologist (Dr. Jorge Denney).    Patient denies night " fever/night sweats, urinary incontinence, bowel incontinence, significant weight loss, significant motor weakness, and loss of sensations.      Pain Disability Index Review:         4/14/2025     2:44 PM 3/6/2025     1:05 PM   Last 3 PDI Scores   Pain Disability Index (PDI) 49 26       Non-Pharmacologic Treatments:  Physical Therapy/Home Exercise: yes  Ice/Heat:yes  TENS: yes  Acupuncture: yes  Massage: yes  Chiropractic: no    Other: yes; ACDF; Dr. Milton Connor      Pain Medications:  - Adjuvant Medications: Trazodone (Desyrel)    Pain Procedures:   -outside institution: [facility]  -3/28/2025 bilateral SIJ injection with 100% relief  x 9 days  Past Medical History:   Diagnosis Date    ADD (attention deficit disorder)     Colitis     Lichen sclerosus     Microscopic colitis     Osteopenia 08/29/2023    Osteopenia of spine Normal Hip     Past Surgical History:   Procedure Laterality Date    BREAST BIOPSY      CERVICAL FUSION  07/15/2021    COLONOSCOPY  09/2021    DIAGNOSTIC LAPAROSCOPY      DILATION AND CURETTAGE OF UTERUS      ENDOSCOPIC ULTRASOUND OF UPPER GASTROINTESTINAL TRACT N/A 12/08/2023    Procedure: ULTRASOUND, UPPER GI TRACT, ENDOSCOPIC;  Surgeon: Eduardo Mondragon MD;  Location: [facility];  Service: Endoscopy;  Laterality: N/A;  11/8 portal instr    HAND SURGERY Right 03/2023    HYSTEROSCOPY      INJECTION OF ANESTHETIC AGENT INTO SACROILIAC JOINT Bilateral 3/28/2025    Procedure: Bilateral Sacroiliac Joint Injection;  Surgeon: Juan Clarke MD;  Location: [facility];  Service: Pain Management;  Laterality: Bilateral;    myosure      REPAIR OF MENISCUS OF KNEE Right 04/2024     Review of patient's allergies indicates:  No Known Allergies    Current Outpatient Medications   Medication Sig    clobetasoL (TEMOVATE) 0.05 % cream Apply topically to affected area twice daily    desvenlafaxine succinate (PRISTIQ) 100 MG Tb24 Take 1 tablet by mouth once daily.    MAGNESIUM CITRATE ORAL Take by  "mouth.    meloxicam (MOBIC) 7.5 MG tablet Take 1 tablet (7.5 mg total) by mouth once daily.    memantine (NAMENDA) 10 MG Tab SMARTSI Tablet(s) By Mouth Morning-Night    methocarbamoL (ROBAXIN) 750 MG Tab Take 1 tablet (750 mg total) by mouth 3 (three) times daily as needed.    moxifloxacin (VIGAMOX) 0.5 % ophthalmic solution Place into both eyes.    perfluorohexyloctane, PF, 100 % Drop Apply 1 drop to eye.    traZODone (DESYREL) 100 MG tablet Take 1 tablet by mouth every evening.    desvenlafaxine succinate (PRISTIQ) 100 MG Tb24     topiramate (TOPAMAX) 25 MG tablet Take 1 tablet (25 mg total) by mouth every evening for 5 days, THEN 1 tablet (25 mg total) 2 (two) times daily for 25 days. Take 1 tablet (25 mg total) by mouth at night x 1 week then increase to 2 (two) times daily. Increase as tolerated.    traZODone (DESYREL) 100 MG tablet      No current facility-administered medications for this visit.         ROS:  GENERAL:  No weight loss, malaise or fevers.  HEENT:   No recent changes in vision or hearing  NECK:  Negative for lumps, no difficulty with swallowing.  RESPIRATORY:  Negative for cough, wheezing or shortness of breath, patient denies any recent URI.  CARDIOVASCULAR:  Negative for chest pain or palpitations.  GI:  Negative for abdominal discomfort, blood in stools or black stools or change in bowel habits.  MUSCULOSKELETAL:  See HPI.  SKIN:  Negative for lesions, rash, and itching.  PSYCH:  No mood disorder or recent psychosocial stressors.   HEMATOLOGY/LYMPHOLOGY:  Negative for prolonged bleeding, bruising easily or swollen nodes.    NEURO:   No history of syncope, paralysis, seizures or tremors.  All other reviewed and negative other than HPI.    OBJECTIVE:    /75   Pulse 85   Ht 5' 8.5" (1.74 m)   Wt 61.8 kg (136 lb 5.7 oz)   BMI 20.43 kg/m²       Physical Exam:    GENERAL: Well appearing, in no acute distress, alert and oriented x3.  PSYCH:  Mood and affect appropriate.  SKIN: Skin " color, texture, turgor normal, no rashes or lesions.  HEAD/FACE:  Normocephalic, atraumatic. Cranial nerves grossly intact.    NECK:  pain to palpation over the bilateral trapezius muscles. Spurling Negative. No pain with neck flexion, extension, or lateral flexion.   Normaltesting biceps, triceps and brachioradialis bilaterally.    NegativeHoffmann's bilaterally.    5/5 strength testing deltoid, biceps, triceps, wrist extensor, wrist flexor and ulnar intrinsics bilaterally.    Normal  strength bilaterally    CV: RRR with palpation of the radial artery.  PULM: No evidence of respiratory difficulty, symmetric chest rise.  GI:  Soft and non-tender.    BACK: Straight leg raising in the sitting and supine positions is negative to radicular pain. No pain to palpation over the facet joints of the lumbar spine or spinous processes. Normal range of motion without pain reproduction.  EXTREMITIES: Peripheral joint ROM is full and pain free without obvious instability or laxity in all four extremities. No deformities, edema, or skin discoloration. Good capillary refill.  MUSCULOSKELETAL: Able to stand on heels & toes.   Shoulder, hip, and knee provocative maneuvers are negative.    SIJ testing:  - TTP over SI joint: negative   - Alejandra's/ Rizwan's: Positive for groin pain  Fadir's positive bilaterally     Facet loading test is positive bilaterally.   Bilateral upper and lower extremity strength is normal and symmetric.  No atrophy or tone abnormalities are noted.    RIGHT Lower extremity: Hip flexion 5/5, Hip Abduction 5/5, Hip Adduction 5/5, Knee extension 5/5, Knee flexion 5/5, Ankle dorsiflexion5/5, Extensor hallucis longus 5/5, Ankle plantarflexion 5/5  LEFT Lower extremity:  Hip flexion 5/5, Hip Abduction 5/5,Hip Adduction 5/5, Knee extension 5/5, Knee flexion 5/5, Ankle dorsiflexion 5/5, Extensor hallucis longus 5/5, Ankle plantarflexion 5/5  -Normal testing knee (patellar) jerk and ankle (achilles) jerk    NEURO:  Bilateral upper and lower extremity coordination and muscle stretch reflexes are physiologic and symmetric. No loss of sensation is noted.  GAIT: normal.    Imaging:   3/20/2025 MRI lumbar  FINDINGS:  There are 5 non-rib-bearing lumbar type vertebrae.  The lowest fully formed intervertebral disc was labeled L5-S1 for the purpose of numbering.     Mild clockwise rotation of the lumbar spine.     Heterogeneous signal in the lumbar spine and pelvis may represent mild red marrow reconversion.  T1 and T2 hypointense focus in the vertebral body of L4 may represent a bone island.     The height of the vertebral bodies is normal.     Multilevel disc desiccation.  Disc height loss L3-L4.     The conus terminates at superior endplate of L2.  It demonstrates normal signal intensity and contour.     The paraspinal soft tissues are normal.     Individual disc levels:     L1-L2: Small disc bulge.  No significant spinal canal or neural foraminal narrowing.     L2-L3: Small disc bulge. No significant spinal canal or neural foraminal narrowing.     L3-L4: Disc bulge with superimposed subarticular right disc protrusion.  Subarticular disc protrusion and disc height loss cause moderate right neural foraminal narrowing.     L4-L5: No evidence of spinal canal narrowing.  Facet arthropathy and left foraminal disc protrusion causes moderate to severe spinal canal narrowing on the left.     L5-S1: No evidence of spinal canal or neural foraminal narrowing.  Facet arthropathy.     Impression:     1. Mild to moderate neural foraminal narrowing in the lumbar spine as described above.  2. Multilevel disc disease in the lumbar spine without significant spinal canal stenosis.    3/6/2025 cervical xray  FINDINGS:  The vertebral bodies demonstrate a normal height.  There is mild reversal of the upper cervical spine.  There is evidence for prior ACDF at the C5-6 level.  Moderate to severe disc space narrowing spondylosis present at C3-4 and C4-5 and  more mild-to-moderate disc space narrowing spondylosis seen at C6-7.  No subluxation seen on the flexion or extension views.  Prominent multilevel bilateral facet arthropathy noted right greater than the left.     Impression:     As above          X-ray cervical spine 09/29/2014  FINDINGS:   AP and lateral views of the spine.     There is a mild reversal of lordosis, typically from positioning and/or muscle   spasm.  This study does not exclude ligamentous injury.     Multilevel moderate disc space narrowing at C3-C4, C4-C5, C5-C6, C6-C7 with   multiple tiny osteophytes.  Prevertebral soft tissues normal.       ASSESSMENT: 64 y.o. year old female with     1. Bilateral hip pain  X-Ray Hips Bilateral 2 View Incl AP Pelvis      2. Sacroiliitis        3. Cervical myofascial pain syndrome        4. S/P cervical spinal fusion        5. Lumbar radiculopathy, chronic                PLAN:   - Interventions: will get xrays bilateral hips today.   Consider PT for hamstring tightness  Could consider bilateral L3/4 TF ELIANE    - Anticoagulation use: No no anticoagulation     report:  Reviewed and consistent with medication use as prescribed.      - Medications:    Pt requests hydrocodone- informed we try to focus on interventional procedures and avoid opioids     -Pt Dced meloxicam- hx of GI issues  Pt has failed gabapentin in the past- SE    Trial on topamax 25mg QHS x 5 days then increase to BID. -We have discussed starting the patient on a Topamax titration.  We discussed potential side effects of this medication include drowsiness, dizziness, tingling of the hands and feet, diarrhea, dysgeusia, weight loss/anorexia.  Patient has been given the following algorithm to follow      -Continue an anti spasmodic, Robaxin  up to 750 mg thrice daily PRN  for myofascial pain.  We have discussed potential deleterious side effects associated with this medication including  dizziness, drowsiness, stomach upset, nausea vomiting or  blurred vision.  Patient expresses understanding.    -A TENS unit was provided to the patient and they were instructed on its use by the [employer] ([employer]) representatives. The patient was counseled that this may help treat their myofascial pain through transcutaneous electrical nerve stimulation and excitation via an electric current.  We have discussed that the unit is usually connected to the skin using two or more electrodes, which are typically conductive gel pads.  A typical battery-operated TENS unit is able to modulate pulse width, frequency and intensity to help reduce pain.    -We have discussed considering future membrane stabilizing agent for neuropathic properties.    - Therapy:   We discussed continuing conventional land-based physical therapy to help manage the patient/s painful condition. The patient was counseled that muscle strengthening will improve the long term prognosis in regards to pain and may also help increase range of motion and mobility.  Of note patient has been enrolled in conventional land-based physical therapy at [facility] from 05/06/2024 through 03/06/2025 and is continuing twice weekly sessions.   Consider starting PT for hip/low back pain    - Imaging: Reviewed available imaging(x-ray cervical spine, MRI lumbar spine, xray lumbar spine) with patient and answered any questions they had regarding study.  Will get xray lul hips today    - Consults/Referrals:(PRN)  -Dr. Jorge Denney: Neurology; early onset Alzheimer's    - Records: Obtain old records from outside physicians and imaging        - Follow up visit: will contact with xray results and discuss case with Dr. Clarke; next appt extended visit      The above plan and management options were discussed at length with patient. Patient is in agreement with the above and verbalized understanding.    - I discussed the goals of interventional chronic pain management with the patient on today's visit. We discussed a  multimodal and systematic approach to pain.  This includes diagnostic and therapeutic injections, adjuvant pharmacologic treatment, physical therapy, and at times psychiatry.  I emphasized the importance of regular exercise, core strengthening and stretching, diet and weight loss as a cornerstone of long-term pain management.    - This condition does not require this patient to take time off of work, and the primary goal of our Pain Management services is to improve the patient's functional capacity.  - Patient Questions: Answered all of the patient's questions regarding diagnoses, therapy, treatment and next steps      Visit today included increased complexity associated with the care of the episodic problem of chronic pain which was addressed and continue to manage the longitudinal care of the patient due to the serious and/or complex managed problem(s) listed above.      Terri De Leon NP  Interventional Pain Management  [facility]    Disclaimer:  This note was prepared using voice recognition system and is likely to have sound alike errors that may have been overlooked even after proof reading.  Please call me with any questions

## 2025-04-15 ENCOUNTER — RESULTS FOLLOW-UP (OUTPATIENT)
Dept: PAIN MEDICINE | Facility: CLINIC | Age: 65
End: 2025-04-15

## 2025-04-15 DIAGNOSIS — M25.551 BILATERAL HIP PAIN: Primary | ICD-10-CM

## 2025-04-15 DIAGNOSIS — M54.16 LUMBAR RADICULOPATHY, CHRONIC: ICD-10-CM

## 2025-04-15 DIAGNOSIS — M46.1 SACROILIITIS: ICD-10-CM

## 2025-04-15 DIAGNOSIS — M25.552 BILATERAL HIP PAIN: Primary | ICD-10-CM

## 2025-04-15 DIAGNOSIS — M79.18 CERVICAL MYOFASCIAL PAIN SYNDROME: ICD-10-CM

## 2025-04-22 ENCOUNTER — TELEPHONE (OUTPATIENT)
Dept: PAIN MEDICINE | Facility: CLINIC | Age: 65
End: 2025-04-22
Payer: MEDICARE

## 2025-04-22 RX ORDER — LECANEMAB 100 MG/ML
INJECTION, SOLUTION INTRAVENOUS
COMMUNITY
Start: 2024-11-06

## 2025-04-22 NOTE — TELEPHONE ENCOUNTER
Reached out to patient to schedule appointment from messages. Pt did not answer left vm ( if this is not a good time let us know)       Luis Pena  Certified Medical Assistant

## 2025-04-22 NOTE — TELEPHONE ENCOUNTER
Reach out to pt to inform her that her apt with  on 4/22/25 was canceled because the pt requested a sooner appointment with our NP which was on 4/14/25. Pt did not answer left v.m

## 2025-04-22 NOTE — TELEPHONE ENCOUNTER
Spoke with Dr. Horn, patient's PCP. Pt had increase memory issues and fogginess with topamax. It was Dced. She was started on meloxicam plus PPI. Pt was in office with shoulder pain and hip groin pain. Concerns for PMR. Sed rate elevated. Has seen rheum but was told not PMR. Dr. Horn would like to get patient in with Dr. Clarke- looks like appt was not scheduled at her last visit.